# Patient Record
Sex: FEMALE | Race: WHITE | Employment: OTHER | URBAN - METROPOLITAN AREA
[De-identification: names, ages, dates, MRNs, and addresses within clinical notes are randomized per-mention and may not be internally consistent; named-entity substitution may affect disease eponyms.]

---

## 2017-04-27 ENCOUNTER — OFFICE VISIT (OUTPATIENT)
Dept: FAMILY MEDICINE CLINIC | Age: 67
End: 2017-04-27

## 2017-04-27 ENCOUNTER — TELEPHONE (OUTPATIENT)
Dept: FAMILY MEDICINE CLINIC | Age: 67
End: 2017-04-27

## 2017-04-27 VITALS
OXYGEN SATURATION: 97 % | SYSTOLIC BLOOD PRESSURE: 110 MMHG | BODY MASS INDEX: 25.36 KG/M2 | WEIGHT: 157.8 LBS | HEART RATE: 68 BPM | DIASTOLIC BLOOD PRESSURE: 50 MMHG

## 2017-04-27 DIAGNOSIS — D17.1 LIPOMA OF ABDOMINAL WALL: Primary | ICD-10-CM

## 2017-04-27 PROCEDURE — G8399 PT W/DXA RESULTS DOCUMENT: HCPCS | Performed by: FAMILY MEDICINE

## 2017-04-27 PROCEDURE — 3017F COLORECTAL CA SCREEN DOC REV: CPT | Performed by: FAMILY MEDICINE

## 2017-04-27 PROCEDURE — G8427 DOCREV CUR MEDS BY ELIG CLIN: HCPCS | Performed by: FAMILY MEDICINE

## 2017-04-27 PROCEDURE — 3014F SCREEN MAMMO DOC REV: CPT | Performed by: FAMILY MEDICINE

## 2017-04-27 PROCEDURE — 1036F TOBACCO NON-USER: CPT | Performed by: FAMILY MEDICINE

## 2017-04-27 PROCEDURE — G8420 CALC BMI NORM PARAMETERS: HCPCS | Performed by: FAMILY MEDICINE

## 2017-04-27 PROCEDURE — 1123F ACP DISCUSS/DSCN MKR DOCD: CPT | Performed by: FAMILY MEDICINE

## 2017-04-27 PROCEDURE — 1090F PRES/ABSN URINE INCON ASSESS: CPT | Performed by: FAMILY MEDICINE

## 2017-04-27 PROCEDURE — 99213 OFFICE O/P EST LOW 20 MIN: CPT | Performed by: FAMILY MEDICINE

## 2017-04-27 PROCEDURE — 4040F PNEUMOC VAC/ADMIN/RCVD: CPT | Performed by: FAMILY MEDICINE

## 2017-04-27 RX ORDER — DICYCLOMINE HCL 20 MG
20 TABLET ORAL 3 TIMES DAILY
COMMUNITY
End: 2017-07-18 | Stop reason: ALTCHOICE

## 2017-04-27 RX ORDER — DOXEPIN HYDROCHLORIDE 25 MG/1
50 CAPSULE ORAL NIGHTLY
COMMUNITY
End: 2018-07-09 | Stop reason: ALTCHOICE

## 2017-04-27 RX ORDER — CALCIUM/MAGNESIUM/ZINC 333-133 MG
1 TABLET ORAL DAILY
COMMUNITY
End: 2017-06-12 | Stop reason: ALTCHOICE

## 2017-04-27 RX ORDER — METHYLPHENIDATE HYDROCHLORIDE 30 MG/1
20 CAPSULE, EXTENDED RELEASE ORAL 3 TIMES DAILY
COMMUNITY
End: 2017-09-27 | Stop reason: CLARIF

## 2017-04-27 NOTE — TELEPHONE ENCOUNTER
JARED --  Patient's appointment was moved to today because she said bulge in abdomin is feeling strange, warm to touch and a little discolored today.

## 2017-06-05 ENCOUNTER — SURG/PROC ORDERS (OUTPATIENT)
Dept: SURGERY | Age: 67
End: 2017-06-05

## 2017-06-05 ENCOUNTER — INITIAL CONSULT (OUTPATIENT)
Dept: SURGERY | Age: 67
End: 2017-06-05

## 2017-06-05 VITALS
HEIGHT: 66 IN | BODY MASS INDEX: 25.23 KG/M2 | SYSTOLIC BLOOD PRESSURE: 124 MMHG | DIASTOLIC BLOOD PRESSURE: 70 MMHG | WEIGHT: 157 LBS

## 2017-06-05 DIAGNOSIS — D17.1 LIPOMA OF TORSO: Primary | ICD-10-CM

## 2017-06-05 PROCEDURE — G8399 PT W/DXA RESULTS DOCUMENT: HCPCS | Performed by: SURGERY

## 2017-06-05 PROCEDURE — 4040F PNEUMOC VAC/ADMIN/RCVD: CPT | Performed by: SURGERY

## 2017-06-05 PROCEDURE — 1036F TOBACCO NON-USER: CPT | Performed by: SURGERY

## 2017-06-05 PROCEDURE — 3014F SCREEN MAMMO DOC REV: CPT | Performed by: SURGERY

## 2017-06-05 PROCEDURE — 1090F PRES/ABSN URINE INCON ASSESS: CPT | Performed by: SURGERY

## 2017-06-05 PROCEDURE — G8427 DOCREV CUR MEDS BY ELIG CLIN: HCPCS | Performed by: SURGERY

## 2017-06-05 PROCEDURE — 3017F COLORECTAL CA SCREEN DOC REV: CPT | Performed by: SURGERY

## 2017-06-05 PROCEDURE — 1123F ACP DISCUSS/DSCN MKR DOCD: CPT | Performed by: SURGERY

## 2017-06-05 PROCEDURE — 99202 OFFICE O/P NEW SF 15 MIN: CPT | Performed by: SURGERY

## 2017-06-05 PROCEDURE — G8420 CALC BMI NORM PARAMETERS: HCPCS | Performed by: SURGERY

## 2017-06-05 RX ORDER — SODIUM CHLORIDE 0.9 % (FLUSH) 0.9 %
10 SYRINGE (ML) INJECTION PRN
Status: CANCELLED | OUTPATIENT
Start: 2017-06-05

## 2017-06-05 RX ORDER — SODIUM CHLORIDE 0.9 % (FLUSH) 0.9 %
10 SYRINGE (ML) INJECTION EVERY 12 HOURS SCHEDULED
Status: CANCELLED | OUTPATIENT
Start: 2017-06-05

## 2017-06-13 ENCOUNTER — TELEPHONE (OUTPATIENT)
Dept: SURGERY | Age: 67
End: 2017-06-13

## 2017-06-16 ENCOUNTER — HOSPITAL ENCOUNTER (OUTPATIENT)
Dept: SURGERY | Age: 67
Discharge: HOME | End: 2017-06-16
Attending: SURGERY | Admitting: SURGERY

## 2017-06-16 VITALS
OXYGEN SATURATION: 97 % | SYSTOLIC BLOOD PRESSURE: 116 MMHG | RESPIRATION RATE: 20 BRPM | TEMPERATURE: 97.2 F | HEART RATE: 59 BPM | BODY MASS INDEX: 25.23 KG/M2 | WEIGHT: 157 LBS | DIASTOLIC BLOOD PRESSURE: 71 MMHG | HEIGHT: 66 IN

## 2017-06-16 PROCEDURE — 22903 EXC ABD LES SC 3 CM/>: CPT | Performed by: SURGERY

## 2017-06-16 RX ORDER — SODIUM CHLORIDE 0.9 % (FLUSH) 0.9 %
10 SYRINGE (ML) INJECTION EVERY 12 HOURS SCHEDULED
Status: DISCONTINUED | OUTPATIENT
Start: 2017-06-16 | End: 2017-06-17 | Stop reason: HOSPADM

## 2017-06-16 RX ORDER — ONDANSETRON 2 MG/ML
4 INJECTION INTRAMUSCULAR; INTRAVENOUS
Status: ACTIVE | OUTPATIENT
Start: 2017-06-16 | End: 2017-06-16

## 2017-06-16 RX ORDER — OXYCODONE HYDROCHLORIDE AND ACETAMINOPHEN 5; 325 MG/1; MG/1
1 TABLET ORAL
Status: ACTIVE | OUTPATIENT
Start: 2017-06-16 | End: 2017-06-16

## 2017-06-16 RX ORDER — HYDRALAZINE HYDROCHLORIDE 20 MG/ML
5 INJECTION INTRAMUSCULAR; INTRAVENOUS EVERY 10 MIN PRN
Status: DISCONTINUED | OUTPATIENT
Start: 2017-06-16 | End: 2017-06-17 | Stop reason: HOSPADM

## 2017-06-16 RX ORDER — SODIUM CHLORIDE, SODIUM LACTATE, POTASSIUM CHLORIDE, CALCIUM CHLORIDE 600; 310; 30; 20 MG/100ML; MG/100ML; MG/100ML; MG/100ML
INJECTION, SOLUTION INTRAVENOUS CONTINUOUS
Status: DISCONTINUED | OUTPATIENT
Start: 2017-06-16 | End: 2017-06-17 | Stop reason: HOSPADM

## 2017-06-16 RX ORDER — HYDROCODONE BITARTRATE AND ACETAMINOPHEN 5; 325 MG/1; MG/1
1-2 TABLET ORAL EVERY 6 HOURS PRN
Qty: 20 TABLET | Refills: 0 | Status: SHIPPED | OUTPATIENT
Start: 2017-06-16 | End: 2017-07-18 | Stop reason: ALTCHOICE

## 2017-06-16 RX ORDER — SODIUM CHLORIDE, SODIUM LACTATE, POTASSIUM CHLORIDE, CALCIUM CHLORIDE 600; 310; 30; 20 MG/100ML; MG/100ML; MG/100ML; MG/100ML
INJECTION, SOLUTION INTRAVENOUS
Status: DISPENSED
Start: 2017-06-16 | End: 2017-06-16

## 2017-06-16 RX ORDER — MEPERIDINE HYDROCHLORIDE 25 MG/ML
12.5 INJECTION INTRAMUSCULAR; INTRAVENOUS; SUBCUTANEOUS EVERY 5 MIN PRN
Status: DISCONTINUED | OUTPATIENT
Start: 2017-06-16 | End: 2017-06-17 | Stop reason: HOSPADM

## 2017-06-16 RX ORDER — HYDROMORPHONE HCL 110MG/55ML
0.5 PATIENT CONTROLLED ANALGESIA SYRINGE INTRAVENOUS EVERY 5 MIN PRN
Status: DISCONTINUED | OUTPATIENT
Start: 2017-06-16 | End: 2017-06-17 | Stop reason: HOSPADM

## 2017-06-16 RX ORDER — SODIUM CHLORIDE 0.9 % (FLUSH) 0.9 %
10 SYRINGE (ML) INJECTION PRN
Status: DISCONTINUED | OUTPATIENT
Start: 2017-06-16 | End: 2017-06-16 | Stop reason: SDUPTHER

## 2017-06-16 RX ORDER — LIDOCAINE HYDROCHLORIDE 10 MG/ML
0.3 INJECTION, SOLUTION EPIDURAL; INFILTRATION; INTRACAUDAL; PERINEURAL
Status: COMPLETED | OUTPATIENT
Start: 2017-06-16 | End: 2017-06-16

## 2017-06-16 RX ORDER — SODIUM CHLORIDE 0.9 % (FLUSH) 0.9 %
10 SYRINGE (ML) INJECTION EVERY 12 HOURS SCHEDULED
Status: DISCONTINUED | OUTPATIENT
Start: 2017-06-16 | End: 2017-06-16 | Stop reason: SDUPTHER

## 2017-06-16 RX ORDER — LABETALOL HYDROCHLORIDE 5 MG/ML
5 INJECTION, SOLUTION INTRAVENOUS EVERY 10 MIN PRN
Status: DISCONTINUED | OUTPATIENT
Start: 2017-06-16 | End: 2017-06-17 | Stop reason: HOSPADM

## 2017-06-16 RX ORDER — SODIUM CHLORIDE 0.9 % (FLUSH) 0.9 %
10 SYRINGE (ML) INJECTION PRN
Status: DISCONTINUED | OUTPATIENT
Start: 2017-06-16 | End: 2017-06-17 | Stop reason: HOSPADM

## 2017-06-16 RX ADMIN — SODIUM CHLORIDE, SODIUM LACTATE, POTASSIUM CHLORIDE, CALCIUM CHLORIDE: 600; 310; 30; 20 INJECTION, SOLUTION INTRAVENOUS at 09:36

## 2017-06-16 RX ADMIN — LIDOCAINE HYDROCHLORIDE 0.3 ML: 10 INJECTION, SOLUTION EPIDURAL; INFILTRATION; INTRACAUDAL; PERINEURAL at 09:36

## 2017-06-16 ASSESSMENT — PAIN SCALES - GENERAL
PAINLEVEL_OUTOF10: 0
PAINLEVEL_OUTOF10: 0

## 2017-06-16 ASSESSMENT — PAIN - FUNCTIONAL ASSESSMENT: PAIN_FUNCTIONAL_ASSESSMENT: 0-10

## 2017-06-19 ENCOUNTER — TELEPHONE (OUTPATIENT)
Dept: SURGERY | Age: 67
End: 2017-06-19

## 2017-07-10 ENCOUNTER — OFFICE VISIT (OUTPATIENT)
Dept: SURGERY | Age: 67
End: 2017-07-10

## 2017-07-10 VITALS
BODY MASS INDEX: 25.23 KG/M2 | WEIGHT: 157 LBS | SYSTOLIC BLOOD PRESSURE: 124 MMHG | HEIGHT: 66 IN | DIASTOLIC BLOOD PRESSURE: 72 MMHG

## 2017-07-10 DIAGNOSIS — Z98.890 POST-OPERATIVE STATE: Primary | ICD-10-CM

## 2017-07-10 PROCEDURE — 99024 POSTOP FOLLOW-UP VISIT: CPT | Performed by: SURGERY

## 2017-07-18 ENCOUNTER — HOSPITAL ENCOUNTER (OUTPATIENT)
Dept: SURGERY | Age: 67
Discharge: HOME | End: 2017-07-18
Attending: INTERNAL MEDICINE | Admitting: INTERNAL MEDICINE

## 2017-07-18 VITALS
OXYGEN SATURATION: 100 % | DIASTOLIC BLOOD PRESSURE: 78 MMHG | HEIGHT: 66 IN | SYSTOLIC BLOOD PRESSURE: 125 MMHG | BODY MASS INDEX: 25.23 KG/M2 | WEIGHT: 157 LBS | RESPIRATION RATE: 16 BRPM | TEMPERATURE: 98.3 F | HEART RATE: 82 BPM

## 2017-07-18 RX ORDER — SODIUM CHLORIDE, SODIUM LACTATE, POTASSIUM CHLORIDE, CALCIUM CHLORIDE 600; 310; 30; 20 MG/100ML; MG/100ML; MG/100ML; MG/100ML
INJECTION, SOLUTION INTRAVENOUS CONTINUOUS
Status: DISCONTINUED | OUTPATIENT
Start: 2017-07-18 | End: 2017-07-19 | Stop reason: HOSPADM

## 2017-07-18 RX ORDER — LIDOCAINE HYDROCHLORIDE 10 MG/ML
0.1 INJECTION, SOLUTION EPIDURAL; INFILTRATION; INTRACAUDAL; PERINEURAL ONCE
Status: DISCONTINUED | OUTPATIENT
Start: 2017-07-18 | End: 2017-07-19 | Stop reason: HOSPADM

## 2017-07-18 RX ADMIN — SODIUM CHLORIDE, SODIUM LACTATE, POTASSIUM CHLORIDE, CALCIUM CHLORIDE: 600; 310; 30; 20 INJECTION, SOLUTION INTRAVENOUS at 09:46

## 2017-07-18 ASSESSMENT — PAIN - FUNCTIONAL ASSESSMENT: PAIN_FUNCTIONAL_ASSESSMENT: 0-10

## 2017-07-18 ASSESSMENT — PAIN SCALES - GENERAL: PAINLEVEL_OUTOF10: 0

## 2017-07-28 ENCOUNTER — NURSE ONLY (OUTPATIENT)
Dept: FAMILY MEDICINE CLINIC | Age: 67
End: 2017-07-28

## 2017-07-28 ENCOUNTER — TELEPHONE (OUTPATIENT)
Dept: FAMILY MEDICINE CLINIC | Age: 67
End: 2017-07-28

## 2017-07-28 DIAGNOSIS — R35.0 FREQUENT URINATION: Primary | ICD-10-CM

## 2017-07-28 LAB
BILIRUBIN, POC: NORMAL
BLOOD URINE, POC: NORMAL
CLARITY, POC: CLEAR
COLOR, POC: NORMAL
GLUCOSE URINE, POC: NORMAL
KETONES, POC: NORMAL
LEUKOCYTE EST, POC: NORMAL
NITRITE, POC: NORMAL
PH, POC: 5
PROTEIN, POC: NORMAL
SPECIFIC GRAVITY, POC: 1
UROBILINOGEN, POC: 0.2

## 2017-07-28 PROCEDURE — 81002 URINALYSIS NONAUTO W/O SCOPE: CPT | Performed by: FAMILY MEDICINE

## 2017-07-28 NOTE — TELEPHONE ENCOUNTER
Patient says symptoms began two days ago, frequent urination and can not quit once she starts, denies any discomfort or burning with urination but says vaginal area is very tender.   Advised may need an appointment but would check first.

## 2017-07-28 NOTE — TELEPHONE ENCOUNTER
Per Dr. Addison Garcia ok to check her urine but if there is more to it than just the urine she will need to schedule an appointment. She was advised and will stop in to give a sample. Placing on lab schedule.

## 2017-07-31 NOTE — TELEPHONE ENCOUNTER
The patient is calling to inform Dayana Adair that she plans on following up with her OBGYN Ting Lowery in regards to the symptoms she came in for on 7/28/17. She said that her symptoms have since gone away but she had an appointment scheduled awhile ago with her OBGYN so she will just discuss this with him at that time.

## 2017-09-22 ENCOUNTER — TELEPHONE (OUTPATIENT)
Dept: FAMILY MEDICINE CLINIC | Age: 67
End: 2017-09-22

## 2017-09-27 ENCOUNTER — OFFICE VISIT (OUTPATIENT)
Dept: FAMILY MEDICINE CLINIC | Age: 67
End: 2017-09-27

## 2017-09-27 VITALS
BODY MASS INDEX: 27 KG/M2 | WEIGHT: 168 LBS | DIASTOLIC BLOOD PRESSURE: 58 MMHG | HEART RATE: 74 BPM | SYSTOLIC BLOOD PRESSURE: 126 MMHG | OXYGEN SATURATION: 94 % | HEIGHT: 66 IN

## 2017-09-27 DIAGNOSIS — R03.0 ELEVATED BLOOD PRESSURE READING WITHOUT DIAGNOSIS OF HYPERTENSION: ICD-10-CM

## 2017-09-27 DIAGNOSIS — R61 NIGHT SWEATS: Primary | ICD-10-CM

## 2017-09-27 DIAGNOSIS — R14.0 ABDOMINAL BLOATING: ICD-10-CM

## 2017-09-27 PROCEDURE — 3017F COLORECTAL CA SCREEN DOC REV: CPT | Performed by: FAMILY MEDICINE

## 2017-09-27 PROCEDURE — 3014F SCREEN MAMMO DOC REV: CPT | Performed by: FAMILY MEDICINE

## 2017-09-27 PROCEDURE — G8417 CALC BMI ABV UP PARAM F/U: HCPCS | Performed by: FAMILY MEDICINE

## 2017-09-27 PROCEDURE — G8427 DOCREV CUR MEDS BY ELIG CLIN: HCPCS | Performed by: FAMILY MEDICINE

## 2017-09-27 PROCEDURE — 4040F PNEUMOC VAC/ADMIN/RCVD: CPT | Performed by: FAMILY MEDICINE

## 2017-09-27 PROCEDURE — G8399 PT W/DXA RESULTS DOCUMENT: HCPCS | Performed by: FAMILY MEDICINE

## 2017-09-27 PROCEDURE — 1123F ACP DISCUSS/DSCN MKR DOCD: CPT | Performed by: FAMILY MEDICINE

## 2017-09-27 PROCEDURE — 1036F TOBACCO NON-USER: CPT | Performed by: FAMILY MEDICINE

## 2017-09-27 PROCEDURE — 99214 OFFICE O/P EST MOD 30 MIN: CPT | Performed by: FAMILY MEDICINE

## 2017-09-27 PROCEDURE — 1090F PRES/ABSN URINE INCON ASSESS: CPT | Performed by: FAMILY MEDICINE

## 2017-09-27 RX ORDER — METHYLPHENIDATE HYDROCHLORIDE 20 MG/1
20 TABLET ORAL 3 TIMES DAILY
COMMUNITY

## 2017-10-03 ENCOUNTER — OFFICE VISIT (OUTPATIENT)
Dept: FAMILY MEDICINE CLINIC | Age: 67
End: 2017-10-03

## 2017-10-03 VITALS
HEIGHT: 66 IN | HEART RATE: 60 BPM | SYSTOLIC BLOOD PRESSURE: 134 MMHG | TEMPERATURE: 98.1 F | DIASTOLIC BLOOD PRESSURE: 72 MMHG | WEIGHT: 169.6 LBS | OXYGEN SATURATION: 97 % | BODY MASS INDEX: 27.26 KG/M2

## 2017-10-03 DIAGNOSIS — Z01.818 PREOP EXAMINATION: Primary | ICD-10-CM

## 2017-10-03 DIAGNOSIS — M15.9 PRIMARY OSTEOARTHRITIS INVOLVING MULTIPLE JOINTS: ICD-10-CM

## 2017-10-03 DIAGNOSIS — M12.9 ARTHROPATHY: ICD-10-CM

## 2017-10-03 PROCEDURE — 93000 ELECTROCARDIOGRAM COMPLETE: CPT | Performed by: PHYSICIAN ASSISTANT

## 2017-10-03 PROCEDURE — 3014F SCREEN MAMMO DOC REV: CPT | Performed by: PHYSICIAN ASSISTANT

## 2017-10-03 PROCEDURE — 1036F TOBACCO NON-USER: CPT | Performed by: PHYSICIAN ASSISTANT

## 2017-10-03 PROCEDURE — 3017F COLORECTAL CA SCREEN DOC REV: CPT | Performed by: PHYSICIAN ASSISTANT

## 2017-10-03 PROCEDURE — 1123F ACP DISCUSS/DSCN MKR DOCD: CPT | Performed by: PHYSICIAN ASSISTANT

## 2017-10-03 PROCEDURE — 4040F PNEUMOC VAC/ADMIN/RCVD: CPT | Performed by: PHYSICIAN ASSISTANT

## 2017-10-03 PROCEDURE — G8417 CALC BMI ABV UP PARAM F/U: HCPCS | Performed by: PHYSICIAN ASSISTANT

## 2017-10-03 PROCEDURE — 1090F PRES/ABSN URINE INCON ASSESS: CPT | Performed by: PHYSICIAN ASSISTANT

## 2017-10-03 PROCEDURE — G8399 PT W/DXA RESULTS DOCUMENT: HCPCS | Performed by: PHYSICIAN ASSISTANT

## 2017-10-03 PROCEDURE — G8484 FLU IMMUNIZE NO ADMIN: HCPCS | Performed by: PHYSICIAN ASSISTANT

## 2017-10-03 PROCEDURE — 99213 OFFICE O/P EST LOW 20 MIN: CPT | Performed by: PHYSICIAN ASSISTANT

## 2017-10-03 PROCEDURE — G8427 DOCREV CUR MEDS BY ELIG CLIN: HCPCS | Performed by: PHYSICIAN ASSISTANT

## 2017-10-03 NOTE — PROGRESS NOTES
[facility]  [address] [place] [address] Dr Au Model: [phone]   F: [phone]    PRE- OPERATIVE HISTORY AND PHYSICAL    HISTORY OF PRESENT ILLNESS:     History Obtained From:  patient  Shannon Mejia 1950 is a 74 y.o. female who presents to the office today for a pre-operative consultation for      PROCEDURE:  Left right finger arthroplasty               INDICATION FOR PROCEDURE:  Osteoarthritis and pain and dysfunction               DATE OF PROCEDURE: 10/9/2017                PHYSICIAN perfoming PROCEDURE:  Dr. Lyn Cortez at [address] [place] [address]     Planned anesthesia is:  general.      History of adverse or allergic reaction to anesthesia:  No  Teeth: DENTURES? No  Bleeding risk?:    NONE; No recent or remote history of abnormal bleeding. Previous transfusion/Complications:   NONE    REVIEW OF SYSTEMS:    CONSTITUTIONAL:  negative  EYES:  negative  HEENT:  negative  RESPIRATORY:  negative  CARDIOVASCULAR:  negative  GASTROINTESTINAL:  negative  GENITOURINARY:  negative  INTEGUMENT/BREAST:  negative  HEMATOLOGIC/LYMPHATIC:  negative  ALLERGIC/IMMUNOLOGIC:  negative  ENDOCRINE:  negative  MUSCULOSKELETAL:  Bouchards and heberdens nodes bilaterally.   NEUROLOGICAL:  negative       Past Medical History:   Diagnosis Date    Anxiety     Arthritis     Closed fracture of arm approx     Depression     Prolonged emergence from general anesthesia         Past Surgical History:   Procedure Laterality Date    ACHILLES TENDON SURGERY      CARPAL TUNNEL RELEASE      CARPAL TUNNEL RELEASE      left     SECTION   /     COLONOSCOPY      COLONOSCOPY      COLONOSCOPY  2017    diverticulosis, polyp    DILATION AND CURETTAGE OF UTERUS      LAPAROSCOPY  x several    endometriosis    OTHER SURGICAL HISTORY  2017    excision lipoma right abdomen       Social History:   Tobacco use:   History   Smoking Status    Former Smoker   

## 2018-02-12 ENCOUNTER — OFFICE VISIT (OUTPATIENT)
Dept: FAMILY MEDICINE CLINIC | Age: 68
End: 2018-02-12

## 2018-02-12 VITALS
WEIGHT: 179 LBS | DIASTOLIC BLOOD PRESSURE: 60 MMHG | HEIGHT: 66 IN | BODY MASS INDEX: 28.77 KG/M2 | TEMPERATURE: 99.1 F | SYSTOLIC BLOOD PRESSURE: 126 MMHG | OXYGEN SATURATION: 98 % | HEART RATE: 80 BPM

## 2018-02-12 DIAGNOSIS — R63.5 WEIGHT GAIN: ICD-10-CM

## 2018-02-12 DIAGNOSIS — J04.0 LARYNGITIS: Primary | ICD-10-CM

## 2018-02-12 PROCEDURE — 1090F PRES/ABSN URINE INCON ASSESS: CPT | Performed by: PHYSICIAN ASSISTANT

## 2018-02-12 PROCEDURE — G8484 FLU IMMUNIZE NO ADMIN: HCPCS | Performed by: PHYSICIAN ASSISTANT

## 2018-02-12 PROCEDURE — G8399 PT W/DXA RESULTS DOCUMENT: HCPCS | Performed by: PHYSICIAN ASSISTANT

## 2018-02-12 PROCEDURE — G8417 CALC BMI ABV UP PARAM F/U: HCPCS | Performed by: PHYSICIAN ASSISTANT

## 2018-02-12 PROCEDURE — G8427 DOCREV CUR MEDS BY ELIG CLIN: HCPCS | Performed by: PHYSICIAN ASSISTANT

## 2018-02-12 PROCEDURE — 4040F PNEUMOC VAC/ADMIN/RCVD: CPT | Performed by: PHYSICIAN ASSISTANT

## 2018-02-12 PROCEDURE — 3014F SCREEN MAMMO DOC REV: CPT | Performed by: PHYSICIAN ASSISTANT

## 2018-02-12 PROCEDURE — 99213 OFFICE O/P EST LOW 20 MIN: CPT | Performed by: PHYSICIAN ASSISTANT

## 2018-02-12 PROCEDURE — 3017F COLORECTAL CA SCREEN DOC REV: CPT | Performed by: PHYSICIAN ASSISTANT

## 2018-02-12 PROCEDURE — 1123F ACP DISCUSS/DSCN MKR DOCD: CPT | Performed by: PHYSICIAN ASSISTANT

## 2018-02-12 PROCEDURE — 1036F TOBACCO NON-USER: CPT | Performed by: PHYSICIAN ASSISTANT

## 2018-02-12 RX ORDER — CALCIUM/MAGNESIUM/ZINC 333-133 MG
TABLET ORAL
COMMUNITY
End: 2018-07-09

## 2018-02-15 ENCOUNTER — TELEPHONE (OUTPATIENT)
Dept: FAMILY MEDICINE CLINIC | Age: 68
End: 2018-02-15

## 2018-02-16 ENCOUNTER — OFFICE VISIT (OUTPATIENT)
Dept: FAMILY MEDICINE CLINIC | Age: 68
End: 2018-02-16

## 2018-02-16 VITALS
TEMPERATURE: 98.1 F | BODY MASS INDEX: 28.55 KG/M2 | WEIGHT: 178.2 LBS | SYSTOLIC BLOOD PRESSURE: 130 MMHG | DIASTOLIC BLOOD PRESSURE: 70 MMHG | HEART RATE: 76 BPM

## 2018-02-16 DIAGNOSIS — J34.89 PURULENT RHINORRHEA: Primary | ICD-10-CM

## 2018-02-16 PROCEDURE — 1123F ACP DISCUSS/DSCN MKR DOCD: CPT | Performed by: FAMILY MEDICINE

## 2018-02-16 PROCEDURE — 1090F PRES/ABSN URINE INCON ASSESS: CPT | Performed by: FAMILY MEDICINE

## 2018-02-16 PROCEDURE — 99213 OFFICE O/P EST LOW 20 MIN: CPT | Performed by: FAMILY MEDICINE

## 2018-02-16 PROCEDURE — 1036F TOBACCO NON-USER: CPT | Performed by: FAMILY MEDICINE

## 2018-02-16 PROCEDURE — G8484 FLU IMMUNIZE NO ADMIN: HCPCS | Performed by: FAMILY MEDICINE

## 2018-02-16 PROCEDURE — G8417 CALC BMI ABV UP PARAM F/U: HCPCS | Performed by: FAMILY MEDICINE

## 2018-02-16 PROCEDURE — G8399 PT W/DXA RESULTS DOCUMENT: HCPCS | Performed by: FAMILY MEDICINE

## 2018-02-16 PROCEDURE — G8427 DOCREV CUR MEDS BY ELIG CLIN: HCPCS | Performed by: FAMILY MEDICINE

## 2018-02-16 PROCEDURE — 3017F COLORECTAL CA SCREEN DOC REV: CPT | Performed by: FAMILY MEDICINE

## 2018-02-16 PROCEDURE — 3014F SCREEN MAMMO DOC REV: CPT | Performed by: FAMILY MEDICINE

## 2018-02-16 PROCEDURE — 4040F PNEUMOC VAC/ADMIN/RCVD: CPT | Performed by: FAMILY MEDICINE

## 2018-02-16 RX ORDER — AMOXICILLIN AND CLAVULANATE POTASSIUM 875; 125 MG/1; MG/1
1 TABLET, FILM COATED ORAL 2 TIMES DAILY
Qty: 20 TABLET | Refills: 0 | Status: SHIPPED | OUTPATIENT
Start: 2018-02-16 | End: 2018-02-26

## 2018-02-16 NOTE — PROGRESS NOTES
Subjective:      Patient ID: Amada Oneill is a 69 y.o. female. HPI  Amada Oneill comes in with cough productive of yellow-green sputum. She says she began with head congestion approximately 3 weeks ago. Approximately 2 weeks ago she began with a cough and chest congestion. She blows the same yellow-green mucus out of her nose that she coughs up. She has not had any fever. She has developed laryngitis. She is taking Mucinex for her symptoms. She then went on to start talking about abdominal bloating which she has had for 6 months or more. Review of Systems  All other systems were reviewed and are negative      Objective:   Physical Exam   Constitutional: She is oriented to person, place, and time. She appears well-developed and well-nourished. Color good     HENT:   Head: Normocephalic and atraumatic. Right Ear: Tympanic membrane, external ear and ear canal normal.   Left Ear: Tympanic membrane, external ear and ear canal normal.   Nose: Rhinorrhea present. Mouth/Throat: Oropharynx is clear and moist. No oropharyngeal exudate, posterior oropharyngeal edema or posterior oropharyngeal erythema. Positive PND noted   Eyes: Conjunctivae are normal. Pupils are equal, round, and reactive to light. Neck: Normal range of motion. Neck supple. Cardiovascular: Normal rate, regular rhythm and normal heart sounds. Pulmonary/Chest: Effort normal and breath sounds normal. No respiratory distress. She has no wheezes. She has no rales. Lymphadenopathy:     She has no cervical adenopathy. Neurological: She is alert and oriented to person, place, and time. Skin: Skin is warm and dry. Nursing note and vitals reviewed.       Assessment:      Though I see no sign of secondary infection, this congestion has persisted a long time and become purulent      Plan:      Because of the prolonged nature of the illness I'm going to treat her with Augmentin 875/125, #20, one twice a day  I've encouraged her to

## 2018-02-23 ENCOUNTER — OFFICE VISIT (OUTPATIENT)
Dept: FAMILY MEDICINE CLINIC | Age: 68
End: 2018-02-23

## 2018-02-23 VITALS
SYSTOLIC BLOOD PRESSURE: 100 MMHG | WEIGHT: 177.6 LBS | DIASTOLIC BLOOD PRESSURE: 70 MMHG | HEART RATE: 60 BPM | BODY MASS INDEX: 28.45 KG/M2

## 2018-02-23 DIAGNOSIS — R14.0 ABDOMINAL BLOATING: Primary | ICD-10-CM

## 2018-02-23 DIAGNOSIS — R14.2 BELCHING: ICD-10-CM

## 2018-02-23 PROCEDURE — 3017F COLORECTAL CA SCREEN DOC REV: CPT | Performed by: FAMILY MEDICINE

## 2018-02-23 PROCEDURE — G8427 DOCREV CUR MEDS BY ELIG CLIN: HCPCS | Performed by: FAMILY MEDICINE

## 2018-02-23 PROCEDURE — 4040F PNEUMOC VAC/ADMIN/RCVD: CPT | Performed by: FAMILY MEDICINE

## 2018-02-23 PROCEDURE — 1090F PRES/ABSN URINE INCON ASSESS: CPT | Performed by: FAMILY MEDICINE

## 2018-02-23 PROCEDURE — 99213 OFFICE O/P EST LOW 20 MIN: CPT | Performed by: FAMILY MEDICINE

## 2018-02-23 PROCEDURE — G8399 PT W/DXA RESULTS DOCUMENT: HCPCS | Performed by: FAMILY MEDICINE

## 2018-02-23 PROCEDURE — G8484 FLU IMMUNIZE NO ADMIN: HCPCS | Performed by: FAMILY MEDICINE

## 2018-02-23 PROCEDURE — 1036F TOBACCO NON-USER: CPT | Performed by: FAMILY MEDICINE

## 2018-02-23 PROCEDURE — 3014F SCREEN MAMMO DOC REV: CPT | Performed by: FAMILY MEDICINE

## 2018-02-23 PROCEDURE — 1123F ACP DISCUSS/DSCN MKR DOCD: CPT | Performed by: FAMILY MEDICINE

## 2018-02-23 PROCEDURE — G8417 CALC BMI ABV UP PARAM F/U: HCPCS | Performed by: FAMILY MEDICINE

## 2018-02-23 ASSESSMENT — PATIENT HEALTH QUESTIONNAIRE - PHQ9
SUM OF ALL RESPONSES TO PHQ QUESTIONS 1-9: 1
2. FEELING DOWN, DEPRESSED OR HOPELESS: 1
1. LITTLE INTEREST OR PLEASURE IN DOING THINGS: 0
SUM OF ALL RESPONSES TO PHQ9 QUESTIONS 1 & 2: 1

## 2018-02-23 NOTE — PROGRESS NOTES
Subjective:      Patient ID: Valentin Colorado is a 69 y.o. female. HPI  Valentin Colorado returns today to talk about abdominal bloating and belching which has been going on for months. She does not pass gas from below only from above. However when asked about the bloating she designates her entire abdomen from diaphragm to pelvis as being bloated. She has tried abner to see if it would help with the bloating and it has not. She has not tried any medication  She does her routine gynecologic care with Dr. Asher David. These symptoms began after her visit with Dr. Gabby Scott of Systems  All other systems were reviewed and are negative      Objective:   Physical Exam   Constitutional: She is oriented to person, place, and time. She appears well-developed and well-nourished. HENT:   Head: Normocephalic and atraumatic. Eyes: Conjunctivae are normal. Pupils are equal, round, and reactive to light. No scleral icterus. Neck: Normal range of motion. Neck supple. Cardiovascular: Normal rate, regular rhythm and normal heart sounds. Pulmonary/Chest: Effort normal and breath sounds normal.   Abdominal: Soft. Bowel sounds are normal. There is no tenderness. There is no rebound and no guarding. Obese but benign  No masses felt   Neurological: She is alert and oriented to person, place, and time. Skin: Skin is warm and dry. Psychiatric: She has a normal mood and affect. Her behavior is normal. Judgment and thought content normal.   Nursing note and vitals reviewed. Assessment:      Abdominal bloating      Plan:      The presence of the belching certainly makes this bloating ilikely to be more upper abdominal and thus likely digestive in etiology.  However because she designates her entire abdomen as being involved I feel we must look at her pelvis to rule out any ovarian pathology  Pelvic ultrasound to be scheduled  We also discussed dietary modifications to try to decrease the gas production as well as

## 2018-03-05 ENCOUNTER — TELEPHONE (OUTPATIENT)
Dept: FAMILY MEDICINE CLINIC | Age: 68
End: 2018-03-05

## 2018-07-09 ENCOUNTER — OFFICE VISIT (OUTPATIENT)
Dept: FAMILY MEDICINE CLINIC | Age: 68
End: 2018-07-09

## 2018-07-09 VITALS
RESPIRATION RATE: 12 BRPM | HEIGHT: 66 IN | SYSTOLIC BLOOD PRESSURE: 129 MMHG | HEART RATE: 52 BPM | OXYGEN SATURATION: 99 % | TEMPERATURE: 98.9 F | WEIGHT: 168.2 LBS | BODY MASS INDEX: 27.03 KG/M2 | DIASTOLIC BLOOD PRESSURE: 63 MMHG

## 2018-07-09 DIAGNOSIS — R25.2 MUSCLE CRAMPS AT NIGHT: ICD-10-CM

## 2018-07-09 DIAGNOSIS — R42 DIZZINESSES: Primary | ICD-10-CM

## 2018-07-09 LAB
A/G RATIO: 2.1 (ref 1.1–2.2)
ALBUMIN SERPL-MCNC: 4.6 G/DL (ref 3.4–5)
ALP BLD-CCNC: 79 U/L (ref 40–129)
ALT SERPL-CCNC: 19 U/L (ref 10–40)
ANION GAP SERPL CALCULATED.3IONS-SCNC: 15 MMOL/L (ref 3–16)
AST SERPL-CCNC: 21 U/L (ref 15–37)
BILIRUB SERPL-MCNC: 1.3 MG/DL (ref 0–1)
BUN BLDV-MCNC: 12 MG/DL (ref 7–20)
CALCIUM SERPL-MCNC: 10.1 MG/DL (ref 8.3–10.6)
CHLORIDE BLD-SCNC: 102 MMOL/L (ref 99–110)
CO2: 25 MMOL/L (ref 21–32)
CREAT SERPL-MCNC: 0.7 MG/DL (ref 0.6–1.2)
GFR AFRICAN AMERICAN: >60
GFR NON-AFRICAN AMERICAN: >60
GLOBULIN: 2.2 G/DL
GLUCOSE BLD-MCNC: 74 MG/DL (ref 70–99)
MAGNESIUM: 2.4 MG/DL (ref 1.8–2.4)
PHOSPHORUS: 3.6 MG/DL (ref 2.5–4.9)
POTASSIUM SERPL-SCNC: 3.9 MMOL/L (ref 3.5–5.1)
SODIUM BLD-SCNC: 142 MMOL/L (ref 136–145)
TOTAL PROTEIN: 6.8 G/DL (ref 6.4–8.2)

## 2018-07-09 PROCEDURE — 4040F PNEUMOC VAC/ADMIN/RCVD: CPT | Performed by: PHYSICIAN ASSISTANT

## 2018-07-09 PROCEDURE — G8417 CALC BMI ABV UP PARAM F/U: HCPCS | Performed by: PHYSICIAN ASSISTANT

## 2018-07-09 PROCEDURE — 3017F COLORECTAL CA SCREEN DOC REV: CPT | Performed by: PHYSICIAN ASSISTANT

## 2018-07-09 PROCEDURE — 36415 COLL VENOUS BLD VENIPUNCTURE: CPT | Performed by: PHYSICIAN ASSISTANT

## 2018-07-09 PROCEDURE — 1090F PRES/ABSN URINE INCON ASSESS: CPT | Performed by: PHYSICIAN ASSISTANT

## 2018-07-09 PROCEDURE — G8427 DOCREV CUR MEDS BY ELIG CLIN: HCPCS | Performed by: PHYSICIAN ASSISTANT

## 2018-07-09 PROCEDURE — 1036F TOBACCO NON-USER: CPT | Performed by: PHYSICIAN ASSISTANT

## 2018-07-09 PROCEDURE — 99214 OFFICE O/P EST MOD 30 MIN: CPT | Performed by: PHYSICIAN ASSISTANT

## 2018-07-09 PROCEDURE — 1123F ACP DISCUSS/DSCN MKR DOCD: CPT | Performed by: PHYSICIAN ASSISTANT

## 2018-07-09 PROCEDURE — G8399 PT W/DXA RESULTS DOCUMENT: HCPCS | Performed by: PHYSICIAN ASSISTANT

## 2018-07-09 RX ORDER — MECLIZINE HYDROCHLORIDE 25 MG/1
25 TABLET ORAL 3 TIMES DAILY PRN
Qty: 20 TABLET | Refills: 0 | Status: SHIPPED | OUTPATIENT
Start: 2018-07-09 | End: 2019-01-14

## 2018-07-09 RX ORDER — POTASSIUM CHLORIDE 1500 MG/1
20 TABLET, FILM COATED, EXTENDED RELEASE ORAL 2 TIMES DAILY WITH MEALS
COMMUNITY
End: 2018-07-09

## 2018-07-09 RX ORDER — LUTEIN 10 MG
TABLET ORAL
COMMUNITY

## 2018-07-09 NOTE — PROGRESS NOTES
PO Take 7.5 mg by mouth daily       KLONOPIN 0.5 MG tablet TAKE 1 TABLET BY MOUTH THREE TIMES A DAY 90 tablet 0    multivitamin (ANTIOXIDANT;PROSIGHT) TABS per tablet Take 1 tablet by mouth daily.  Coenzyme Q10 100 MG CAPS Take  by mouth 2 times daily. No current facility-administered medications for this visit. PHYSICAL EXAM     Vitals:    07/09/18 1628   BP: 129/63   Pulse: 52   Resp: 12   Temp: 98.9 °F (37.2 °C)   TempSrc: Oral   SpO2: 99%   Weight: 168 lb 3.2 oz (76.3 kg)   Height: 5' 6.25\" (1.683 m)     APPEARANCE: Pleasant, friendly, well-nourished. No acute distress. HEENT: Normocephalic, atraumatic. Eyes: EOMI,PERRLA. Conjunctiva pink and moist.  Sclera white. Funduscopic without hemorrhages or edema. Vision grossly intact. Ears: External ears uniform. Hearing intact. Canals are clear. TMs are intact with fluid bulge. Nose: No septal deviation. Nares are patent. Mouth: Oral mucosa moist. Throat is without erythema, exudates, edema. NECK: No lymphadenopathy or masses. No JVD or bruits. HEART: Reg rate and rhythm. No murmurs, rubs, or gallops. LUNGS: Clear to auscultation. No wheezes, rales, or rhonchi. ABDOMEN:  Soft, bowel sounds present,  non-tender, no masses or organomegaly. MUSCULOSKELETOL:  No new deformity. No clubbing, cyanosis or edema. NEUROLOGIC: Grossly nonfocal.  Cranial nerves II through XII are intact. Romberg negative. No pronator drift. No Nystagmus. SKIN: Warm, dry, normal turgor. Cap refill <3secs. No rashes, petechiae, purpura. ADDITIONAL STUDIES     Pertinent laboratory results and/or imaging reviewed. Orders Placed This Encounter   Procedures    Comprehensive Metabolic Panel    MAGNESIUM    PHOSPHORUS         IMPRESSION/ PLAN      1. Dizzinesses    2. Muscle cramps at night    Discontinue over-the-counter potassium. Lab work today. Trial of meclizine. Follow-up in one week. If worse go to the ER immediately.  Diagnosis and instructions discussed in detail. Patient Instructions   Begin trial of meclizine. Stop the over-the-counter potassium. If worse go to the ER otherwise follow-up in one week.        Orders Placed This Encounter   Medications    meclizine (ANTIVERT) 25 MG tablet     Sig: Take 1 tablet by mouth 3 times daily as needed for Dizziness     Dispense:  20 tablet     Refill:  0

## 2018-07-16 ENCOUNTER — OFFICE VISIT (OUTPATIENT)
Dept: FAMILY MEDICINE CLINIC | Age: 68
End: 2018-07-16

## 2018-07-16 VITALS
BODY MASS INDEX: 27.1 KG/M2 | RESPIRATION RATE: 12 BRPM | OXYGEN SATURATION: 98 % | HEART RATE: 65 BPM | DIASTOLIC BLOOD PRESSURE: 52 MMHG | WEIGHT: 168.6 LBS | SYSTOLIC BLOOD PRESSURE: 118 MMHG | TEMPERATURE: 98 F | HEIGHT: 66 IN

## 2018-07-16 DIAGNOSIS — R42 DIZZINESSES: Primary | ICD-10-CM

## 2018-07-16 DIAGNOSIS — K58.0 IRRITABLE BOWEL SYNDROME WITH DIARRHEA: ICD-10-CM

## 2018-07-16 PROCEDURE — 1036F TOBACCO NON-USER: CPT | Performed by: PHYSICIAN ASSISTANT

## 2018-07-16 PROCEDURE — 1101F PT FALLS ASSESS-DOCD LE1/YR: CPT | Performed by: PHYSICIAN ASSISTANT

## 2018-07-16 PROCEDURE — G8417 CALC BMI ABV UP PARAM F/U: HCPCS | Performed by: PHYSICIAN ASSISTANT

## 2018-07-16 PROCEDURE — G8399 PT W/DXA RESULTS DOCUMENT: HCPCS | Performed by: PHYSICIAN ASSISTANT

## 2018-07-16 PROCEDURE — 3017F COLORECTAL CA SCREEN DOC REV: CPT | Performed by: PHYSICIAN ASSISTANT

## 2018-07-16 PROCEDURE — 4040F PNEUMOC VAC/ADMIN/RCVD: CPT | Performed by: PHYSICIAN ASSISTANT

## 2018-07-16 PROCEDURE — G8427 DOCREV CUR MEDS BY ELIG CLIN: HCPCS | Performed by: PHYSICIAN ASSISTANT

## 2018-07-16 PROCEDURE — 99213 OFFICE O/P EST LOW 20 MIN: CPT | Performed by: PHYSICIAN ASSISTANT

## 2018-07-16 PROCEDURE — 1123F ACP DISCUSS/DSCN MKR DOCD: CPT | Performed by: PHYSICIAN ASSISTANT

## 2018-07-16 PROCEDURE — 1090F PRES/ABSN URINE INCON ASSESS: CPT | Performed by: PHYSICIAN ASSISTANT

## 2018-07-16 NOTE — PROGRESS NOTES
[facility] 25 COMPLAINT  Chief Complaint   Patient presents with    Dizziness     pt states that she was seen last week for dizziness, states that she is feeling better. HISTORY OF PRESENT  ILLNESS  Ana Vee is a 69 y.o.  female   Dizziness Has completely resolved. Meclizine helped for a few days and is no longer taking it. All she wishes to go over herbal medicines. Also complains of bloating in the stomach with soft stools. Has seen the GI specialist, had colonoscopy which shows colitis. Was tried on irritable bowel diarrhea meds of which she did not like. ROS    Remaining are reviewed and otherwise negative for other constitutional, neurologic, EENT, cardiac, pulmonary, GI, , musculoskeletal or extremity complaints. PAST MEDICAL/SURGICAL, SOCIAL, &  FAMILY HISTORY:  Reviewed and updated accordingly. ALLERGIES :   Latex; Strattera [atomoxetine hcl]; Viberzi [eluxadoline]; and Wellbutrin [bupropion hcl]    MEDICATIONS:  Current Outpatient Prescriptions on File Prior to Visit   Medication Sig Dispense Refill    Lutein 10 MG TABS Take by mouth      meclizine (ANTIVERT) 25 MG tablet Take 1 tablet by mouth 3 times daily as needed for Dizziness 20 tablet 0    Cyanocobalamin (VITAMIN B 12 PO) Take by mouth      NONFORMULARY oreganol  supplement      zinc 50 MG CAPS Take by mouth      NONFORMULARY       UNABLE TO FIND Glucosamine, chondroitin, MSM, hyaluronic acid      methylphenidate (RITALIN) 20 MG tablet Take 20 mg by mouth 3 times daily .       Turmeric 500 MG CAPS Take by mouth 2 times daily       CINNAMON PO Take 1,000 mg by mouth 2 times daily       RESVERATROL PO Take 500 mg by mouth 2 times daily       Calcium Carb-Cholecalciferol (CALCIUM 500 +D PO) Take by mouth      Fish Oil OIL Take 1 g by mouth 2 times daily       valACYclovir (VALTREX) 500 MG tablet Take 500 mg by mouth daily       Probiotic Product (PROBIOTIC DAILY PO) Take  by mouth.  MELOXICAM PO Take 7.5 mg by mouth daily       KLONOPIN 0.5 MG tablet TAKE 1 TABLET BY MOUTH THREE TIMES A DAY 90 tablet 0    multivitamin (ANTIOXIDANT;PROSIGHT) TABS per tablet Take 1 tablet by mouth daily.  Coenzyme Q10 100 MG CAPS Take  by mouth 2 times daily.  VORTIoxetine (TRINTELLIX) 10 MG TABS tablet Take 10 mg by mouth daily       No current facility-administered medications on file prior to visit. PHYSICAL EXAM     Vitals:    07/16/18 1411   BP: (!) 118/52   Pulse: 65   Resp: 12   Temp: 98 °F (36.7 °C)   TempSrc: Oral   SpO2: 98%   Weight: 168 lb 9.6 oz (76.5 kg)   Height: 5' 6.25\" (1.683 m)     APPEARANCE: Pleasant, friendly, well-nourished. No acute distress. Head: Normocephalic, atraumatic. Eyes: EOMI,PERRLA. Conjunctiva pink and moist.  Sclera white. Ears: External ears uniform. Hearing intact. TMs intact canals clear. Neck rate is clear. Nose: No septal deviation. Mouth: Oral mucosa moist. Throat is without erythema, exudates, edema. NECK: No lymphadenopathy or masses. HEART: Reg rate and rhythm. No murmurs, rubs, or gallops. LUNGS: Clear to auscultation. No wheezes, rales, or rhonchi. ABDOMEN:  Soft, bowel sounds present,  non-tender, no masses or organomegaly. MUSCULOSKELETOL: BACK, EXTREMITIES:  No new deformity. No clubbing, cyanosis or edema. NEUROLOGIC: Normal gross and sensory exam.  SKIN: Warm, dry, normal turgor. Cap refill <3secs. No rashes, petechiae, purpura. ADDITIONAL STUDIES     Pertinent laboratory results and/or imaging reviewed. No orders of the defined types were placed in this encounter. IMPRESSION/ PLAN      1. Dizzinesses :  Symptoms resolved. We'll continue to monitor. .     2. Irritable bowel syndrome with diarrhea   Does not wish take prescription medications. Discussed syndrome in detail. Diagnosis and instructions discussed in detail.    There are no Patient Instructions on file for

## 2018-08-21 ENCOUNTER — OFFICE VISIT (OUTPATIENT)
Dept: FAMILY MEDICINE CLINIC | Age: 68
End: 2018-08-21

## 2018-08-21 VITALS
OXYGEN SATURATION: 98 % | SYSTOLIC BLOOD PRESSURE: 130 MMHG | HEIGHT: 66 IN | BODY MASS INDEX: 26.68 KG/M2 | WEIGHT: 166 LBS | DIASTOLIC BLOOD PRESSURE: 60 MMHG | HEART RATE: 80 BPM

## 2018-08-21 DIAGNOSIS — M25.561 PAIN, JOINT, KNEE, RIGHT: Primary | ICD-10-CM

## 2018-08-21 DIAGNOSIS — K58.0 IRRITABLE BOWEL SYNDROME WITH DIARRHEA: ICD-10-CM

## 2018-08-21 DIAGNOSIS — M15.9 PRIMARY OSTEOARTHRITIS INVOLVING MULTIPLE JOINTS: Chronic | ICD-10-CM

## 2018-08-21 DIAGNOSIS — M25.562 PAIN IN JOINT OF LEFT KNEE: ICD-10-CM

## 2018-08-21 DIAGNOSIS — F33.40 RECURRENT MAJOR DEPRESSIVE DISORDER, IN REMISSION (HCC): Primary | Chronic | ICD-10-CM

## 2018-08-21 DIAGNOSIS — Z23 NEED FOR PNEUMOCOCCAL VACCINE: ICD-10-CM

## 2018-08-21 PROCEDURE — 1123F ACP DISCUSS/DSCN MKR DOCD: CPT | Performed by: FAMILY MEDICINE

## 2018-08-21 PROCEDURE — 90670 PCV13 VACCINE IM: CPT | Performed by: FAMILY MEDICINE

## 2018-08-21 PROCEDURE — 1101F PT FALLS ASSESS-DOCD LE1/YR: CPT | Performed by: FAMILY MEDICINE

## 2018-08-21 PROCEDURE — G8427 DOCREV CUR MEDS BY ELIG CLIN: HCPCS | Performed by: FAMILY MEDICINE

## 2018-08-21 PROCEDURE — 4040F PNEUMOC VAC/ADMIN/RCVD: CPT | Performed by: FAMILY MEDICINE

## 2018-08-21 PROCEDURE — 99214 OFFICE O/P EST MOD 30 MIN: CPT | Performed by: FAMILY MEDICINE

## 2018-08-21 PROCEDURE — 1090F PRES/ABSN URINE INCON ASSESS: CPT | Performed by: FAMILY MEDICINE

## 2018-08-21 PROCEDURE — G8399 PT W/DXA RESULTS DOCUMENT: HCPCS | Performed by: FAMILY MEDICINE

## 2018-08-21 PROCEDURE — G0009 ADMIN PNEUMOCOCCAL VACCINE: HCPCS | Performed by: FAMILY MEDICINE

## 2018-08-21 PROCEDURE — 1036F TOBACCO NON-USER: CPT | Performed by: FAMILY MEDICINE

## 2018-08-21 PROCEDURE — 3017F COLORECTAL CA SCREEN DOC REV: CPT | Performed by: FAMILY MEDICINE

## 2018-08-21 PROCEDURE — G8417 CALC BMI ABV UP PARAM F/U: HCPCS | Performed by: FAMILY MEDICINE

## 2018-08-21 RX ORDER — CLOBETASOL PROPIONATE 0.5 MG/G
OINTMENT TOPICAL 2 TIMES DAILY
Status: ON HOLD | COMMUNITY
End: 2022-07-29 | Stop reason: HOSPADM

## 2018-08-21 ASSESSMENT — ENCOUNTER SYMPTOMS
DIARRHEA: 1
CONSTIPATION: 0

## 2018-08-21 NOTE — PROGRESS NOTES
25 MG tablet Take 1 tablet by mouth 3 times daily as needed for Dizziness 20 tablet 0    NONFORMULARY oreganol  supplement      zinc 50 MG CAPS Take by mouth      NONFORMULARY        No current facility-administered medications for this visit. Patients past medical history, surgical history, family history, medications and allergies were all reviewed and updated as appropriate today. Review of Systems   Constitutional: Negative for fever and malaise/fatigue. Gastrointestinal: Positive for diarrhea. Negative for constipation. Musculoskeletal: Positive for myalgias. Psychiatric/Behavioral: Positive for depression. The patient is nervous/anxious and has insomnia. Physical Exam   Constitutional: No distress. Neck: Neck supple. Cardiovascular: Normal rate, regular rhythm and normal heart sounds. Pulmonary/Chest: Effort normal and breath sounds normal. She has no wheezes. She has no rales. Musculoskeletal: She exhibits no edema. Neurological: She is alert. Psychiatric: She has a normal mood and affect. Her behavior is normal. Judgment and thought content normal.         Vitals:    08/21/18 1408 08/21/18 1438   BP: (!) 154/74 130/60   Site: Left Arm    Position: Sitting    Cuff Size: Medium Adult    Pulse: 80    SpO2: 98%    Weight: 166 lb (75.3 kg)    Height: 5' 6.25\" (1.683 m)        Assessment/Plan: Caren Thorpe was seen today for dizziness and diarrhea. Diagnoses and all orders for this visit:    Recurrent major depressive disorder, in remission (HCC)Continue with psychiatry    Primary osteoarthritis involving multiple joints continue with rheumatology    Irritable bowel syndrome with diarrhea continue with GI    Need for pneumococcal vaccine  -     Pneumococcal conjugate vaccine 13-valent      Patient will continue to work with her specialist. She is encouraged to exercise by walking on a regular basis.

## 2019-01-14 ENCOUNTER — OFFICE VISIT (OUTPATIENT)
Dept: FAMILY MEDICINE CLINIC | Age: 69
End: 2019-01-14
Payer: MEDICARE

## 2019-01-14 VITALS
HEIGHT: 66 IN | OXYGEN SATURATION: 97 % | HEART RATE: 81 BPM | SYSTOLIC BLOOD PRESSURE: 130 MMHG | RESPIRATION RATE: 18 BRPM | TEMPERATURE: 98.3 F | DIASTOLIC BLOOD PRESSURE: 68 MMHG | WEIGHT: 167 LBS | BODY MASS INDEX: 26.84 KG/M2

## 2019-01-14 DIAGNOSIS — S70.01XA CONTUSION OF RIGHT HIP, INITIAL ENCOUNTER: ICD-10-CM

## 2019-01-14 DIAGNOSIS — J01.90 ACUTE NON-RECURRENT SINUSITIS, UNSPECIFIED LOCATION: ICD-10-CM

## 2019-01-14 DIAGNOSIS — F33.40 RECURRENT MAJOR DEPRESSIVE DISORDER, IN REMISSION (HCC): ICD-10-CM

## 2019-01-14 DIAGNOSIS — J06.9 ACUTE UPPER RESPIRATORY INFECTION: Primary | ICD-10-CM

## 2019-01-14 PROCEDURE — 99214 OFFICE O/P EST MOD 30 MIN: CPT | Performed by: PHYSICIAN ASSISTANT

## 2019-01-14 RX ORDER — AMOXICILLIN 500 MG/1
500 CAPSULE ORAL 3 TIMES DAILY
Qty: 30 CAPSULE | Refills: 0 | Status: SHIPPED | OUTPATIENT
Start: 2019-01-14 | End: 2019-01-24

## 2019-01-14 RX ORDER — GUAIFENESIN 600 MG/1
TABLET, EXTENDED RELEASE ORAL
Qty: 30 TABLET | Refills: 0 | Status: ON HOLD | OUTPATIENT
Start: 2019-01-14 | End: 2022-07-29 | Stop reason: HOSPADM

## 2019-01-16 ENCOUNTER — TELEPHONE (OUTPATIENT)
Dept: FAMILY MEDICINE CLINIC | Age: 69
End: 2019-01-16

## 2019-01-16 NOTE — TELEPHONE ENCOUNTER
Patient was seen on 1-14-19 for sinus infection and prescribed antibiotic. She has taken medication for two days. She states she is not feeling any better. When she blows her nose it is yellow. She is coughing, but not productive, not coughing alot. I advised she needed to complete the antibiotic, let it get into her system and start working. She says the medication makes her a little nauseous.

## 2019-01-16 NOTE — TELEPHONE ENCOUNTER
Patient was taking meds on an empty stomach.  Advised to eat something with her med and give it at least a week to start helping her as she has only taken 2 pills

## 2019-02-08 ENCOUNTER — TELEPHONE (OUTPATIENT)
Dept: FAMILY MEDICINE CLINIC | Age: 69
End: 2019-02-08

## 2019-09-03 ENCOUNTER — OFFICE VISIT (OUTPATIENT)
Dept: FAMILY MEDICINE CLINIC | Age: 69
End: 2019-09-03
Payer: MEDICARE

## 2019-09-03 VITALS
BODY MASS INDEX: 27.1 KG/M2 | OXYGEN SATURATION: 98 % | HEART RATE: 66 BPM | DIASTOLIC BLOOD PRESSURE: 60 MMHG | HEIGHT: 66 IN | SYSTOLIC BLOOD PRESSURE: 116 MMHG | WEIGHT: 168.6 LBS

## 2019-09-03 DIAGNOSIS — K12.1 MOUTH ULCERS: Primary | ICD-10-CM

## 2019-09-03 PROCEDURE — 1036F TOBACCO NON-USER: CPT | Performed by: PHYSICIAN ASSISTANT

## 2019-09-03 PROCEDURE — 4040F PNEUMOC VAC/ADMIN/RCVD: CPT | Performed by: PHYSICIAN ASSISTANT

## 2019-09-03 PROCEDURE — 99213 OFFICE O/P EST LOW 20 MIN: CPT | Performed by: PHYSICIAN ASSISTANT

## 2019-09-03 PROCEDURE — G8427 DOCREV CUR MEDS BY ELIG CLIN: HCPCS | Performed by: PHYSICIAN ASSISTANT

## 2019-09-03 PROCEDURE — G8419 CALC BMI OUT NRM PARAM NOF/U: HCPCS | Performed by: PHYSICIAN ASSISTANT

## 2019-09-03 PROCEDURE — 1123F ACP DISCUSS/DSCN MKR DOCD: CPT | Performed by: PHYSICIAN ASSISTANT

## 2019-09-03 PROCEDURE — G8399 PT W/DXA RESULTS DOCUMENT: HCPCS | Performed by: PHYSICIAN ASSISTANT

## 2019-09-03 PROCEDURE — 3017F COLORECTAL CA SCREEN DOC REV: CPT | Performed by: PHYSICIAN ASSISTANT

## 2019-09-03 PROCEDURE — 1090F PRES/ABSN URINE INCON ASSESS: CPT | Performed by: PHYSICIAN ASSISTANT

## 2019-09-03 PROCEDURE — 3014F SCREEN MAMMO DOC REV: CPT | Performed by: PHYSICIAN ASSISTANT

## 2019-09-03 RX ORDER — QUETIAPINE FUMARATE 25 MG/1
TABLET, FILM COATED ORAL 2 TIMES DAILY
COMMUNITY
Start: 2018-10-16

## 2019-09-03 RX ORDER — CHLORHEXIDINE GLUCONATE 0.12 MG/ML
15 RINSE ORAL 2 TIMES DAILY
Qty: 300 ML | Refills: 0 | Status: SHIPPED | OUTPATIENT
Start: 2019-09-03 | End: 2019-09-13

## 2019-09-03 ASSESSMENT — ENCOUNTER SYMPTOMS: RESPIRATORY NEGATIVE: 1

## 2019-10-02 ENCOUNTER — TELEPHONE (OUTPATIENT)
Dept: FAMILY MEDICINE CLINIC | Age: 69
End: 2019-10-02

## 2020-02-03 ENCOUNTER — TELEPHONE (OUTPATIENT)
Dept: FAMILY MEDICINE CLINIC | Age: 70
End: 2020-02-03

## 2020-02-03 NOTE — TELEPHONE ENCOUNTER
Patient has had sinus pressure and chest congestion 1 week wants to know if she should be seen. Or continue taking over the counter medications.

## 2020-02-04 ENCOUNTER — OFFICE VISIT (OUTPATIENT)
Dept: FAMILY MEDICINE CLINIC | Age: 70
End: 2020-02-04
Payer: MEDICARE

## 2020-02-04 ENCOUNTER — TELEPHONE (OUTPATIENT)
Dept: FAMILY MEDICINE CLINIC | Age: 70
End: 2020-02-04

## 2020-02-04 VITALS
TEMPERATURE: 98.2 F | HEART RATE: 75 BPM | HEIGHT: 66 IN | DIASTOLIC BLOOD PRESSURE: 78 MMHG | SYSTOLIC BLOOD PRESSURE: 118 MMHG | BODY MASS INDEX: 26.03 KG/M2 | WEIGHT: 162 LBS | RESPIRATION RATE: 18 BRPM | OXYGEN SATURATION: 98 %

## 2020-02-04 PROCEDURE — G8417 CALC BMI ABV UP PARAM F/U: HCPCS | Performed by: PHYSICIAN ASSISTANT

## 2020-02-04 PROCEDURE — G8399 PT W/DXA RESULTS DOCUMENT: HCPCS | Performed by: PHYSICIAN ASSISTANT

## 2020-02-04 PROCEDURE — G8427 DOCREV CUR MEDS BY ELIG CLIN: HCPCS | Performed by: PHYSICIAN ASSISTANT

## 2020-02-04 PROCEDURE — 99213 OFFICE O/P EST LOW 20 MIN: CPT | Performed by: PHYSICIAN ASSISTANT

## 2020-02-04 PROCEDURE — 1036F TOBACCO NON-USER: CPT | Performed by: PHYSICIAN ASSISTANT

## 2020-02-04 PROCEDURE — 1090F PRES/ABSN URINE INCON ASSESS: CPT | Performed by: PHYSICIAN ASSISTANT

## 2020-02-04 PROCEDURE — G9899 SCRN MAM PERF RSLTS DOC: HCPCS | Performed by: PHYSICIAN ASSISTANT

## 2020-02-04 PROCEDURE — G8484 FLU IMMUNIZE NO ADMIN: HCPCS | Performed by: PHYSICIAN ASSISTANT

## 2020-02-04 PROCEDURE — 3017F COLORECTAL CA SCREEN DOC REV: CPT | Performed by: PHYSICIAN ASSISTANT

## 2020-02-04 PROCEDURE — 4040F PNEUMOC VAC/ADMIN/RCVD: CPT | Performed by: PHYSICIAN ASSISTANT

## 2020-02-04 PROCEDURE — 1123F ACP DISCUSS/DSCN MKR DOCD: CPT | Performed by: PHYSICIAN ASSISTANT

## 2020-02-04 RX ORDER — BENZONATATE 100 MG/1
100 CAPSULE ORAL 3 TIMES DAILY PRN
Qty: 20 CAPSULE | Refills: 0 | Status: SHIPPED | OUTPATIENT
Start: 2020-02-04 | End: 2020-02-24

## 2020-02-04 RX ORDER — AMOXICILLIN AND CLAVULANATE POTASSIUM 875; 125 MG/1; MG/1
1 TABLET, FILM COATED ORAL 2 TIMES DAILY
Qty: 14 TABLET | Refills: 0 | Status: SHIPPED | OUTPATIENT
Start: 2020-02-04 | End: 2020-02-11

## 2020-02-04 NOTE — PROGRESS NOTES
[address]    CC:    Chief Complaint   Patient presents with    Congestion     Pt has cough, congestion and runny nose x 1 week     Cough    Nasal Congestion         HISTORY OF PRESENT ILLNESS:      Everardo Virgen is a 68 y.o. female here as per chief complaint. Began 14 days ago with purulent nasal discharge, nasal congestion and sneezing. Sick contacts are  none. Remedies tried are mucinex. .Denies fever, myalgias/arthralgias, headache, sneezing, sore throat, rash and neck pain. ROS:  Remaining 14 ROS were reviewed and are unremarkable for other constitutional, EENT, cardiac, pulmonary, GI, , neurologic, musculoskeletal, or integumentary complaints. Past Medical/Surgical/ Social HISTORY: Reviewed and updated. MEDICATIONS/ ALLERGIES:  Reviewed and updated. PHYSICAL EXAMINATION:  Vitals:    02/04/20 1054   BP: 118/78   Site: Right Upper Arm   Position: Sitting   Pulse: 75   Resp: 18   Temp: 98.2 °F (36.8 °C)   TempSrc: Oral   SpO2: 98%   Weight: 162 lb (73.5 kg)   Height: 5' 6.25\" (1.683 m)     GENERAL APPEARANCE:  Looks in no distress. HEAD: Normocephalic. EYES:  EOMI, PERRLA. Conjunctivae pink and moist. Sclera white. EARS:  Negative pinna pull. Canals clear. TM's intact with cloudy   inner ear fluid. No mastoid tenderness. NOSE : mucosa erythematous and swollen  MOUTH/THROAT:     normal, without erythema, without exudate  NECK:  no adenopathy, thyroid normal to palpation  HEART:  Regular rate and rhythm. No murmurs, rubs, or gallops. LUNGS: no wheezes. No rales or rhonchi. Equal chest percussion. ABDOMEN:  Soft, non-tender. No masses. EXTREMITIES:  Moves all extremities. No edema  NEUROLOGIC: Grossly non focal.   SKIN: Warm, dry without rashes, petechia, or purpura. ADDITIONAL DATA:  Prior notes reviewed. No results found for this visit on 02/04/20. ASSESSMENT/ PLAN:    1. Acute bacterial sinusitis    2.  Cough       Take antibiotic on

## 2020-02-04 NOTE — PATIENT INSTRUCTIONS
Take antibiotic on a full stomach. Tessalon Perles for cough as needed. Increase water intake while on Mucinex.

## 2020-02-20 ENCOUNTER — TELEPHONE (OUTPATIENT)
Dept: FAMILY MEDICINE CLINIC | Age: 70
End: 2020-02-20

## 2022-07-25 ENCOUNTER — ANESTHESIA EVENT (OUTPATIENT)
Dept: ENDOSCOPY | Age: 72
DRG: 920 | End: 2022-07-25
Payer: MEDICARE

## 2022-07-25 NOTE — PROGRESS NOTES
ENDOSCOPY PREOP INSTRUCTIONS      You are scheduled for a procedure at [facility] [employer] on 7-26 @ 800. You will need to arrival by: 630 (at least an hour & a half prior to planned start time)  Report to the MAIN entrance on [address] and register at the information desk on the left-hand side of the lobby  You will need your insurance & photo ID with you. For your procedure:     PLEASE FOLLOW ALL INSTRUCTIONS & PREPS GIVEN TO YOU FROM YOUR DOCTOR'S OFFICE. If you have not received these instructions yet, please call the office immediately. Make sure to read them as soon as received. Bring an accurate list of any medications, including the dose/ frequency, with you on the day of the procedure. Make sure to include over the counter medications. If you are taking blood thinners, Aspirin or diabetic medication, make sure to call your doctor as soon as possible for instructions prior to your procedure. Please dress comfortably and do not wear any lotion, powders or jewelry  Arrange for someone to be with you and sign you out & drive you home after your procedure. We allow 2 visitors with you in the hospital & both of you are required to be masked.     WOMEN ONLY OF CHILDBEARING AGE: Please make sure to be able to give a urine sample on arrival      If you have further questions, you may contact your Endoscopist's office or Pre Admission Testing staff at [phone]  [facility] ELGIN López.7/25/2022 .10:28 AM

## 2022-07-26 ENCOUNTER — HOSPITAL ENCOUNTER (OUTPATIENT)
Age: 72
Setting detail: OUTPATIENT SURGERY
Discharge: HOME | DRG: 920 | End: 2022-07-26
Attending: INTERNAL MEDICINE | Admitting: INTERNAL MEDICINE
Payer: MEDICARE

## 2022-07-26 ENCOUNTER — HOSPITAL ENCOUNTER (INPATIENT)
Age: 72
LOS: 2 days | Discharge: HOME | DRG: 920 | End: 2022-07-29
Attending: EMERGENCY MEDICINE | Admitting: INTERNAL MEDICINE
Payer: MEDICARE

## 2022-07-26 ENCOUNTER — APPOINTMENT (OUTPATIENT)
Dept: CT IMAGING | Age: 72
DRG: 920 | End: 2022-07-26
Payer: MEDICARE

## 2022-07-26 ENCOUNTER — ANESTHESIA (OUTPATIENT)
Dept: ENDOSCOPY | Age: 72
DRG: 920 | End: 2022-07-26
Payer: MEDICARE

## 2022-07-26 ENCOUNTER — APPOINTMENT (OUTPATIENT)
Dept: GENERAL RADIOLOGY | Age: 72
DRG: 920 | End: 2022-07-26
Attending: INTERNAL MEDICINE
Payer: MEDICARE

## 2022-07-26 VITALS
BODY MASS INDEX: 23.3 KG/M2 | WEIGHT: 145 LBS | SYSTOLIC BLOOD PRESSURE: 134 MMHG | HEART RATE: 58 BPM | HEIGHT: 66 IN | TEMPERATURE: 97.8 F | DIASTOLIC BLOOD PRESSURE: 48 MMHG | RESPIRATION RATE: 16 BRPM | OXYGEN SATURATION: 100 %

## 2022-07-26 DIAGNOSIS — D73.5 PERISPLENIC HEMATOMA: Primary | ICD-10-CM

## 2022-07-26 DIAGNOSIS — R19.7 DIARRHEA, UNSPECIFIED TYPE: ICD-10-CM

## 2022-07-26 LAB
A/G RATIO: 1.9 (ref 1.1–2.2)
ALBUMIN SERPL-MCNC: 4.3 G/DL (ref 3.4–5)
ALP BLD-CCNC: 73 U/L (ref 40–129)
ALT SERPL-CCNC: 18 U/L (ref 10–40)
ANION GAP SERPL CALCULATED.3IONS-SCNC: 11 MMOL/L (ref 3–16)
AST SERPL-CCNC: 16 U/L (ref 15–37)
BASOPHILS ABSOLUTE: 0.1 K/UL (ref 0–0.2)
BASOPHILS RELATIVE PERCENT: 0.6 %
BILIRUB SERPL-MCNC: 0.7 MG/DL (ref 0–1)
BUN BLDV-MCNC: 15 MG/DL (ref 7–20)
CALCIUM SERPL-MCNC: 9.4 MG/DL (ref 8.3–10.6)
CHLORIDE BLD-SCNC: 101 MMOL/L (ref 99–110)
CO2: 25 MMOL/L (ref 21–32)
CREAT SERPL-MCNC: <0.5 MG/DL (ref 0.6–1.2)
EOSINOPHILS ABSOLUTE: 0.1 K/UL (ref 0–0.6)
EOSINOPHILS RELATIVE PERCENT: 1.3 %
GFR AFRICAN AMERICAN: >60
GFR NON-AFRICAN AMERICAN: >60
GLUCOSE BLD-MCNC: 90 MG/DL (ref 70–99)
HCT VFR BLD CALC: 37 % (ref 36–48)
HEMOGLOBIN: 12.3 G/DL (ref 12–16)
LACTIC ACID: 0.4 MMOL/L (ref 0.4–2)
LIPASE: 29 U/L (ref 13–60)
LYMPHOCYTES ABSOLUTE: 2 K/UL (ref 1–5.1)
LYMPHOCYTES RELATIVE PERCENT: 23.1 %
MCH RBC QN AUTO: 30.6 PG (ref 26–34)
MCHC RBC AUTO-ENTMCNC: 33.3 G/DL (ref 31–36)
MCV RBC AUTO: 92 FL (ref 80–100)
MONOCYTES ABSOLUTE: 0.7 K/UL (ref 0–1.3)
MONOCYTES RELATIVE PERCENT: 8.4 %
NEUTROPHILS ABSOLUTE: 5.7 K/UL (ref 1.7–7.7)
NEUTROPHILS RELATIVE PERCENT: 66.6 %
PDW BLD-RTO: 14.4 % (ref 12.4–15.4)
PLATELET # BLD: 308 K/UL (ref 135–450)
PMV BLD AUTO: 8 FL (ref 5–10.5)
POTASSIUM SERPL-SCNC: 3.6 MMOL/L (ref 3.5–5.1)
RBC # BLD: 4.02 M/UL (ref 4–5.2)
SODIUM BLD-SCNC: 137 MMOL/L (ref 136–145)
TOTAL PROTEIN: 6.6 G/DL (ref 6.4–8.2)
WBC # BLD: 8.6 K/UL (ref 4–11)

## 2022-07-26 PROCEDURE — 85025 COMPLETE CBC W/AUTO DIFF WBC: CPT

## 2022-07-26 PROCEDURE — 6360000002 HC RX W HCPCS: Performed by: NURSE ANESTHETIST, CERTIFIED REGISTERED

## 2022-07-26 PROCEDURE — 3609010600 HC COLONOSCOPY POLYPECTOMY SNARE/COLD BIOPSY: Performed by: INTERNAL MEDICINE

## 2022-07-26 PROCEDURE — 6360000004 HC RX CONTRAST MEDICATION: Performed by: EMERGENCY MEDICINE

## 2022-07-26 PROCEDURE — 7100000010 HC PHASE II RECOVERY - FIRST 15 MIN: Performed by: INTERNAL MEDICINE

## 2022-07-26 PROCEDURE — 99285 EMERGENCY DEPT VISIT HI MDM: CPT

## 2022-07-26 PROCEDURE — 36415 COLL VENOUS BLD VENIPUNCTURE: CPT

## 2022-07-26 PROCEDURE — 2709999900 HC NON-CHARGEABLE SUPPLY: Performed by: INTERNAL MEDICINE

## 2022-07-26 PROCEDURE — 74177 CT ABD & PELVIS W/CONTRAST: CPT

## 2022-07-26 PROCEDURE — 83605 ASSAY OF LACTIC ACID: CPT

## 2022-07-26 PROCEDURE — 74022 RADEX COMPL AQT ABD SERIES: CPT

## 2022-07-26 PROCEDURE — 7100000011 HC PHASE II RECOVERY - ADDTL 15 MIN: Performed by: INTERNAL MEDICINE

## 2022-07-26 PROCEDURE — 0DBN8ZX EXCISION OF SIGMOID COLON, VIA NATURAL OR ARTIFICIAL OPENING ENDOSCOPIC, DIAGNOSTIC: ICD-10-PCS | Performed by: INTERNAL MEDICINE

## 2022-07-26 PROCEDURE — 2500000003 HC RX 250 WO HCPCS: Performed by: NURSE ANESTHETIST, CERTIFIED REGISTERED

## 2022-07-26 PROCEDURE — 2580000003 HC RX 258: Performed by: ANESTHESIOLOGY

## 2022-07-26 PROCEDURE — 3700000000 HC ANESTHESIA ATTENDED CARE: Performed by: INTERNAL MEDICINE

## 2022-07-26 PROCEDURE — 88305 TISSUE EXAM BY PATHOLOGIST: CPT

## 2022-07-26 PROCEDURE — 83690 ASSAY OF LIPASE: CPT

## 2022-07-26 PROCEDURE — 3609010300 HC COLONOSCOPY W/BIOPSY SINGLE/MULTIPLE: Performed by: INTERNAL MEDICINE

## 2022-07-26 PROCEDURE — 80053 COMPREHEN METABOLIC PANEL: CPT

## 2022-07-26 PROCEDURE — 3700000001 HC ADD 15 MINUTES (ANESTHESIA): Performed by: INTERNAL MEDICINE

## 2022-07-26 RX ORDER — SODIUM CHLORIDE, SODIUM LACTATE, POTASSIUM CHLORIDE, CALCIUM CHLORIDE 600; 310; 30; 20 MG/100ML; MG/100ML; MG/100ML; MG/100ML
INJECTION, SOLUTION INTRAVENOUS CONTINUOUS
Status: DISCONTINUED | OUTPATIENT
Start: 2022-07-26 | End: 2022-07-26 | Stop reason: HOSPADM

## 2022-07-26 RX ORDER — MEPERIDINE HYDROCHLORIDE 25 MG/ML
12.5 INJECTION INTRAMUSCULAR; INTRAVENOUS; SUBCUTANEOUS EVERY 5 MIN PRN
Status: CANCELLED | OUTPATIENT
Start: 2022-07-26

## 2022-07-26 RX ORDER — SODIUM CHLORIDE 0.9 % (FLUSH) 0.9 %
5-40 SYRINGE (ML) INJECTION EVERY 12 HOURS SCHEDULED
Status: DISCONTINUED | OUTPATIENT
Start: 2022-07-26 | End: 2022-07-26 | Stop reason: HOSPADM

## 2022-07-26 RX ORDER — MORPHINE SULFATE 4 MG/ML
4 INJECTION, SOLUTION INTRAMUSCULAR; INTRAVENOUS ONCE
Status: COMPLETED | OUTPATIENT
Start: 2022-07-27 | End: 2022-07-27

## 2022-07-26 RX ORDER — OXYCODONE HYDROCHLORIDE 5 MG/1
5 TABLET ORAL PRN
Status: CANCELLED | OUTPATIENT
Start: 2022-07-26 | End: 2022-07-26

## 2022-07-26 RX ORDER — DIPHENHYDRAMINE HYDROCHLORIDE 50 MG/ML
12.5 INJECTION INTRAMUSCULAR; INTRAVENOUS
Status: CANCELLED | OUTPATIENT
Start: 2022-07-26 | End: 2022-07-26

## 2022-07-26 RX ORDER — SODIUM CHLORIDE 0.9 % (FLUSH) 0.9 %
5-40 SYRINGE (ML) INJECTION PRN
Status: CANCELLED | OUTPATIENT
Start: 2022-07-26

## 2022-07-26 RX ORDER — OXYCODONE HYDROCHLORIDE 5 MG/1
10 TABLET ORAL PRN
Status: CANCELLED | OUTPATIENT
Start: 2022-07-26 | End: 2022-07-26

## 2022-07-26 RX ORDER — GLYCOPYRROLATE 0.2 MG/ML
INJECTION INTRAMUSCULAR; INTRAVENOUS PRN
Status: DISCONTINUED | OUTPATIENT
Start: 2022-07-26 | End: 2022-07-26 | Stop reason: SDUPTHER

## 2022-07-26 RX ORDER — LABETALOL HYDROCHLORIDE 5 MG/ML
10 INJECTION, SOLUTION INTRAVENOUS
Status: CANCELLED | OUTPATIENT
Start: 2022-07-26

## 2022-07-26 RX ORDER — SODIUM CHLORIDE 9 MG/ML
25 INJECTION, SOLUTION INTRAVENOUS PRN
Status: CANCELLED | OUTPATIENT
Start: 2022-07-26

## 2022-07-26 RX ORDER — PROPOFOL 10 MG/ML
INJECTION, EMULSION INTRAVENOUS PRN
Status: DISCONTINUED | OUTPATIENT
Start: 2022-07-26 | End: 2022-07-26 | Stop reason: SDUPTHER

## 2022-07-26 RX ORDER — SODIUM CHLORIDE 9 MG/ML
INJECTION, SOLUTION INTRAVENOUS PRN
Status: DISCONTINUED | OUTPATIENT
Start: 2022-07-26 | End: 2022-07-26 | Stop reason: HOSPADM

## 2022-07-26 RX ORDER — SODIUM CHLORIDE 0.9 % (FLUSH) 0.9 %
5-40 SYRINGE (ML) INJECTION EVERY 12 HOURS SCHEDULED
Status: CANCELLED | OUTPATIENT
Start: 2022-07-26

## 2022-07-26 RX ORDER — HYDRALAZINE HYDROCHLORIDE 20 MG/ML
10 INJECTION INTRAMUSCULAR; INTRAVENOUS
Status: CANCELLED | OUTPATIENT
Start: 2022-07-26

## 2022-07-26 RX ORDER — SODIUM CHLORIDE 0.9 % (FLUSH) 0.9 %
5-40 SYRINGE (ML) INJECTION PRN
Status: DISCONTINUED | OUTPATIENT
Start: 2022-07-26 | End: 2022-07-26 | Stop reason: HOSPADM

## 2022-07-26 RX ORDER — METOCLOPRAMIDE HYDROCHLORIDE 5 MG/ML
10 INJECTION INTRAMUSCULAR; INTRAVENOUS
Status: CANCELLED | OUTPATIENT
Start: 2022-07-26 | End: 2022-07-26

## 2022-07-26 RX ORDER — PROPOFOL 10 MG/ML
INJECTION, EMULSION INTRAVENOUS CONTINUOUS PRN
Status: DISCONTINUED | OUTPATIENT
Start: 2022-07-26 | End: 2022-07-26 | Stop reason: SDUPTHER

## 2022-07-26 RX ORDER — LIDOCAINE HYDROCHLORIDE 20 MG/ML
INJECTION, SOLUTION INTRAVENOUS PRN
Status: DISCONTINUED | OUTPATIENT
Start: 2022-07-26 | End: 2022-07-26 | Stop reason: SDUPTHER

## 2022-07-26 RX ADMIN — LIDOCAINE HYDROCHLORIDE 50 MG: 20 INJECTION, SOLUTION INTRAVENOUS at 08:15

## 2022-07-26 RX ADMIN — SODIUM CHLORIDE, SODIUM LACTATE, POTASSIUM CHLORIDE, AND CALCIUM CHLORIDE: .6; .31; .03; .02 INJECTION, SOLUTION INTRAVENOUS at 08:02

## 2022-07-26 RX ADMIN — PROPOFOL 75 MG: 10 INJECTION, EMULSION INTRAVENOUS at 08:15

## 2022-07-26 RX ADMIN — PROPOFOL 125 MCG/KG/MIN: 10 INJECTION, EMULSION INTRAVENOUS at 08:16

## 2022-07-26 RX ADMIN — PROPOFOL 20 MG: 10 INJECTION, EMULSION INTRAVENOUS at 08:20

## 2022-07-26 RX ADMIN — IOPAMIDOL 80 ML: 755 INJECTION, SOLUTION INTRAVENOUS at 23:32

## 2022-07-26 RX ADMIN — PROPOFOL 20 MG: 10 INJECTION, EMULSION INTRAVENOUS at 08:18

## 2022-07-26 RX ADMIN — GLYCOPYRROLATE 0.2 MG: 0.2 INJECTION INTRAMUSCULAR; INTRAVENOUS at 08:37

## 2022-07-26 ASSESSMENT — PAIN DESCRIPTION - DESCRIPTORS
DESCRIPTORS: CRAMPING;SHARP
DESCRIPTORS: DISCOMFORT;CRAMPING;SHARP;STABBING
DESCRIPTORS: ACHING
DESCRIPTORS: SHARP

## 2022-07-26 ASSESSMENT — PAIN - FUNCTIONAL ASSESSMENT
PAIN_FUNCTIONAL_ASSESSMENT: 0-10
PAIN_FUNCTIONAL_ASSESSMENT: 0-10

## 2022-07-26 ASSESSMENT — PAIN DESCRIPTION - PAIN TYPE
TYPE: ACUTE PAIN
TYPE: ACUTE PAIN

## 2022-07-26 ASSESSMENT — PAIN DESCRIPTION - ORIENTATION
ORIENTATION: LEFT;LOWER;MID;UPPER
ORIENTATION: LEFT;LOWER
ORIENTATION: LEFT

## 2022-07-26 ASSESSMENT — PAIN DESCRIPTION - FREQUENCY
FREQUENCY: INTERMITTENT
FREQUENCY: CONTINUOUS
FREQUENCY: CONTINUOUS
FREQUENCY: INTERMITTENT

## 2022-07-26 ASSESSMENT — PAIN SCALES - GENERAL
PAINLEVEL_OUTOF10: 7
PAINLEVEL_OUTOF10: 10
PAINLEVEL_OUTOF10: 9
PAINLEVEL_OUTOF10: 10

## 2022-07-26 ASSESSMENT — PAIN DESCRIPTION - LOCATION
LOCATION: ABDOMEN

## 2022-07-26 NOTE — DISCHARGE INSTRUCTIONS
ENDOSCOPY DISCHARGE INSTRUCTIONS:    Call the physician that did your procedure for any questions or concern:    [facility]: [phone]  DR. ROSANGELA ELIZALDE      ACTIVITY:    There are potential side effects to the medications used for sedation and anesthesia during your procedure. These include:  Dizziness or light-headedness, confusion or memory loss, delayed reaction times, loss of coordination, nausea and vomiting. Because of your increased risk for injury, we ask that you observe the following precautions: For the next 24 hours,  DO NOT operate an automobile, bicycle, motorcycle, , power tools or large equipment of any kind. Do not drink alcohol, sign any legal documents or make any legal decisions for 24 hours. Do not bend your head over lower than your heart. DO sit on the side of bed/couch awhile before getting up. Plan on bedrest or quiet relaxation today. You may resume normal activities in 24 hours. DIET:    Your first meal today should be light, avoiding spicy and fatty foods. If you tolerate this first meal, then you may advance to your regular diet unless otherwise advised by your physician. NORMAL SYMPTOMS:  -Mild sore throat if youve had an EGD   -Gaseous discomfort    NOTIFY YOUR PHYSICIAN IF THESE SYMPTOMS OCCUR:  1. Fever (greater than 100)  5. Increased abdominal bloating  2. Severe pain    6. Excessive bleeding  3. Nausea and vomiting  7. Chest pain                                                                    4. Chills    8. Shortness of breath    ADDITIONAL INSTRUCTIONS:    Biopsy results: Call [address],[facility] for biopsy results in 1 week    Educational Information:           Learning About Colitis  What is colitis? Colitis is swelling (inflammation) of the colon. The colon makes up most of thelarge intestine. Many conditions can cause colitis. What are some types of colitis? Infectious colitis is a type that appears suddenly.  It's caused by a bacteria or virus, such as salmonella, shigella, or campylobacter. Ischemic colitis is caused by problems with blood flow to the colon. This can happen after surgery. It can also be caused by other health problems. Microscopic colitis doesn't always have a clear cause. It can only be found with special tests. Crohn's disease and ulcerative colitis are lifelong diseases. They can cause swelling, inflammation, and deep sores in the lining of the digestive tract. What are the symptoms? Symptoms may include fever, diarrhea that may be bloody, or belly pain. You may also have an urgent need to move your bowels or pain when you move yourbowels. Or you may have bleeding from the rectum or weight loss. Your symptoms may depend on the type of colitis you have. For example,microscopic colitis may cause watery diarrhea. Sometimes symptoms go away on their own. If they don't go away, or if you havebleeding or severe pain, call your doctor right away. How is it diagnosed? You may need blood tests or a stool test. You also may need imaging tests like a CT scan. You may have a colonoscopy so that a doctor can look inside your colon. In some cases, the doctor may want to test a sample of tissue from theintestine. This test is called a biopsy. How is it treated? Treatment for colitis depends on the condition that is causing it. Antibiotics may be used to treat an infection. Diet changes may help with symptoms. Medicines can also help to relieve inflammation and control symptoms. In some cases, surgery to remove parts of the intestine may be needed. Follow-up care is a key part of your treatment and safety. Be sure to make and go to all appointments, and call your doctor if you are having problems. It's also a good idea to know your test results and keep alist of the medicines you take. Where can you learn more? Go to https://chpepicewjones.[email or web address]. org and sign in to your Bar[employer] account.  Enter C130 in the [address] Information box to learn more about \"Learning About Colitis. \"     If you do not have an account, please click on the \"Sign Up Now\" link. Current as of: September 8, 2021               Content Version: 13.3  © 2006-2022 [employer]. Care instructions adapted under license by [facility] ([facility]). If you have questions about a medical condition or this instruction, always ask your healthcare professional. [address] any warranty or liability for your use of this information. Learning About Colitis  What is colitis? Colitis is swelling (inflammation) of the colon. The colon makes up most of thelarge intestine. Many conditions can cause colitis. What are some types of colitis? Infectious colitis is a type that appears suddenly. It's caused by a bacteria or virus, such as salmonella, shigella, or campylobacter. Ischemic colitis is caused by problems with blood flow to the colon. This can happen after surgery. It can also be caused by other health problems. Microscopic colitis doesn't always have a clear cause. It can only be found with special tests. Crohn's disease and ulcerative colitis are lifelong diseases. They can cause swelling, inflammation, and deep sores in the lining of the digestive tract. What are the symptoms? Symptoms may include fever, diarrhea that may be bloody, or belly pain. You may also have an urgent need to move your bowels or pain when you move yourbowels. Or you may have bleeding from the rectum or weight loss. Your symptoms may depend on the type of colitis you have. For example,microscopic colitis may cause watery diarrhea. Sometimes symptoms go away on their own. If they don't go away, or if you havebleeding or severe pain, call your doctor right away. How is it diagnosed? You may need blood tests or a stool test. You also may need imaging tests like a CT scan. You may have a colonoscopy so that a doctor can look inside your colon.  In some cases, the doctor may want to test a sample of tissue from theintestine. This test is called a biopsy. How is it treated? Treatment for colitis depends on the condition that is causing it. Antibiotics may be used to treat an infection. Diet changes may help with symptoms. Medicines can also help to relieve inflammation and control symptoms. In some cases, surgery to remove parts of the intestine may be needed. Follow-up care is a key part of your treatment and safety. Be sure to make and go to all appointments, and call your doctor if you are having problems. It's also a good idea to know your test results and keep alist of the medicines you take. Where can you learn more? Go to https://ITC.Ourcast. org and sign in to your anfix account. Enter C130 in the Inspire box to learn more about \"Learning About Colitis. \"     If you do not have an account, please click on the \"Sign Up Now\" link. Current as of: September 8, 2021               Content Version: 13.3  © 1467-9679 [employer], [employer]. Care instructions adapted under license by [facility] ([facility]). If you have questions about a medical condition or this instruction, always ask your healthcare professional. [address] any warranty or liability for your use of this information. Please review these discharge instructions this evening or tomorrow for  information you may have forgotten. We want to thank you for choosing the [facility] your [facility] care provider. We always strive to provide you with excellent care while you are here. You may receive a survey in the mail regarding your care. We would appreciate you taking a few minutes of your time to complete this survey.

## 2022-07-26 NOTE — ANESTHESIA POSTPROCEDURE EVALUATION
Department of Anesthesiology  Postprocedure Note    Patient: Kya Uriarte  MRN: [id number]  YOB: 1950  Date of evaluation: 7/26/2022      Procedure Summary     Date: 07/26/22 Room / Location: [facility]    Anesthesia Start: 1463 Anesthesia Stop: 7489    Procedures:       COLONOSCOPY WITH BIOPSY      COLONOSCOPY POLYPECTOMY SNARE/COLD BIOPSY Diagnosis:       Diarrhea, unspecified type      (Diarrhea, unspecified type [R19.7])    Surgeons: Basil Delgado MD Responsible Provider: Reddy Escamilla MD    Anesthesia Type: general ASA Status: 2          Anesthesia Type: No value filed.     Luca Phase I: Luca Score: 10    Luca Phase II:        Anesthesia Post Evaluation    Patient location during evaluation: PACU  Patient participation: complete - patient participated  Level of consciousness: awake and alert  Pain score: 0  Airway patency: patent  Nausea & Vomiting: no nausea and no vomiting  Complications: no  Cardiovascular status: hemodynamically stable  Respiratory status: acceptable  Hydration status: euvolemic

## 2022-07-26 NOTE — ANESTHESIA PRE PROCEDURE
Department of Anesthesiology  Preprocedure Note       Name:  Jh Valdovinos   Age:  66 y.o.  :  1950                                          MRN:  [id number]         Date:  2022      Surgeon: Edna Mulligan):  Justin Wells MD    Procedure: Procedure(s):  COLONOSCOPY    Medications prior to admission:   Prior to Admission medications    Medication Sig Start Date End Date Taking? Authorizing Provider   QUEtiapine (SEROQUEL) 25 MG tablet 2 times daily  10/16/18   Historical Provider, MD   VORTIoxetine (TRINTELLIX) 10 MG TABS tablet Take by mouth    Historical Provider, MD   guaiFENesin (MUCINEX) 600 MG extended release tablet 1-2 tablets of 600mg twice a day as needed. Increase water intake. 19   DEION Oliveira   Glucosamine-Chondroitin (MOVE FREE PO) Take by mouth    Historical Provider, MD   clobetasol (TEMOVATE) 0.05 % ointment Apply topically 2 times daily Apply topically 2 times daily. Historical Provider, MD   Lutein 10 MG TABS Take by mouth    Historical Provider, MD   Cyanocobalamin (VITAMIN B 12 PO) Take by mouth    Historical Provider, MD   NONFORMULARY oreganol  supplement    Historical Provider, MD   zinc 50 MG CAPS Take by mouth    Historical Provider, MD   NONFORMULARY     Historical Provider, MD   methylphenidate (RITALIN) 20 MG tablet Take 20 mg by mouth 3 times daily .     Historical Provider, MD   Turmeric 500 MG CAPS Take by mouth 2 times daily     Historical Provider, MD   CINNAMON PO Take 1,000 mg by mouth 2 times daily     Historical Provider, MD   RESVERATROL PO Take 500 mg by mouth 2 times daily     Historical Provider, MD   Fish Oil OIL Take 1 g by mouth 2 times daily     Historical Provider, MD   valACYclovir (VALTREX) 500 MG tablet Take 500 mg by mouth daily  11/6/15   Historical Provider, MD   MELOXICAM PO Take 7.5 mg by mouth daily     Historical Provider, MD   KLONOPIN 0.5 MG tablet TAKE 1 TABLET BY MOUTH THREE TIMES A DAY 12   Maite Bautista MD multivitamin (ANTIOXIDANT;PROSIGHT) TABS per tablet Take 1 tablet by mouth daily. Historical Provider, MD   Coenzyme Q10 100 MG CAPS Take  by mouth 2 times daily. Historical Provider, MD       Current medications:    No current facility-administered medications for this encounter. Allergies: Allergies   Allergen Reactions    Latex     Strattera [Atomoxetine Hcl]      Hair loss      Viberzi [Eluxadoline] Nausea Only    Wellbutrin [Bupropion Hcl] Swelling       Problem List:    Patient Active Problem List   Diagnosis Code    Family history of breast cancer Z80.3    Osteoarthritis M19.90    Elevated LDL cholesterol level E78.00    ADD (attention deficit disorder) F98.8    Lipoma of torso D17.1    Irritable bowel syndrome with diarrhea K58.0    Recurrent major depressive disorder, in remission ([facility] [address] F33.40       Past Medical History:        Diagnosis Date    Anxiety     Arthritis     Closed fracture of arm approx     Depression     Prolonged emergence from general anesthesia        Past Surgical History:        Procedure Laterality Date    ACHILLES TENDON SURGERY      CARPAL TUNNEL RELEASE      CARPAL TUNNEL RELEASE      left     SECTION      COLONOSCOPY      COLONOSCOPY      COLONOSCOPY  2017    diverticulosis, polyp    DILATION AND CURETTAGE OF UTERUS      FINGER SURGERY  10/05/2017    Silastic proximal interphalangeal joint arthroplasty, distal interphalangeal joint fusion left ring     LAPAROSCOPY  x several    endometriosis    OTHER SURGICAL HISTORY  2017    excision lipoma right abdomen       Social History:    Social History     Tobacco Use    Smoking status: Former     Packs/day: 0.50     Years: 20.00     Pack years: 10.00     Types: Cigarettes     Quit date: 2016     Years since quittin.1    Smokeless tobacco: Never    Tobacco comments:      quit  with hypnotism. quit for over a year .  quit cigs again in May, 2016, but still occ cigarin 2007, but started back when lost job. 4/15/16 down to 1/4-1/2 ppd. stopped again in May, 2016 but still smokes occ cigar . no cigars since about July, 20   Substance Use Topics    Alcohol use: Yes     Alcohol/week: 0.0 standard drinks     Comment: 3 times a year                                Counseling given: Not Answered  Tobacco comments:  quit 1980 with hypnotism. quit for over a year . quit cigs again in May, 2016, but still occ cigarin 2007, but started back when lost job. 4/15/16 down to 1/4-1/2 ppd. stopped again in May, 2016 but still smokes occ cigar . no cigars since about July, 20      Vital Signs (Current): There were no vitals filed for this visit.                                            BP Readings from Last 3 Encounters:   02/04/20 118/78   09/03/19 116/60   01/14/19 130/68       NPO Status:                                                                                 BMI:   Wt Readings from Last 3 Encounters:   02/04/20 162 lb (73.5 kg)   09/03/19 168 lb 9.6 oz (76.5 kg)   01/14/19 167 lb (75.8 kg)     There is no height or weight on file to calculate BMI.    CBC:   Lab Results   Component Value Date/Time    WBC 7.1 01/31/2014 12:09 PM    RBC 4.36 01/31/2014 12:09 PM    HGB 13.6 01/31/2014 12:09 PM    HCT 41.4 01/31/2014 12:09 PM    MCV 94.8 01/31/2014 12:09 PM    RDW 14.0 01/31/2014 12:09 PM     01/31/2014 12:09 PM       CMP:   Lab Results   Component Value Date/Time     07/09/2018 04:51 PM    K 3.9 07/09/2018 04:51 PM     07/09/2018 04:51 PM    CO2 25 07/09/2018 04:51 PM    BUN 12 07/09/2018 04:51 PM    CREATININE 0.7 07/09/2018 04:51 PM    GFRAA >60 07/09/2018 04:51 PM    GFRAA >60 12/21/2009 04:36 PM    AGRATIO 2.1 07/09/2018 04:51 PM    LABGLOM >60 07/09/2018 04:51 PM    GLUCOSE 74 07/09/2018 04:51 PM    PROT 6.8 07/09/2018 04:51 PM    PROT 6.9 12/21/2009 04:36 PM    CALCIUM 10.1 07/09/2018 04:51 PM    BILITOT 1.3 07/09/2018 04:51 PM ALKPHOS 79 07/09/2018 04:51 PM    AST 21 07/09/2018 04:51 PM    ALT 19 07/09/2018 04:51 PM       POC Tests: No results for input(s): POCGLU, POCNA, POCK, POCCL, POCBUN, POCHEMO, POCHCT in the last 72 hours. Coags: No results found for: PROTIME, INR, APTT    HCG (If Applicable): No results found for: PREGTESTUR, PREGSERUM, HCG, HCGQUANT     ABGs: No results found for: PHART, PO2ART, PVE7ILY, CMJ8EHU, BEART, O3KQRJEY     Type & Screen (If Applicable):  No results found for: LABABO, LABRH    Drug/Infectious Status (If Applicable):  No results found for: HIV, HEPCAB    COVID-19 Screening (If Applicable): No results found for: COVID19        Anesthesia Evaluation  Patient summary reviewed and Nursing notes reviewed no history of anesthetic complications:   Airway: Mallampati: II  TM distance: >3 FB   Neck ROM: full  Mouth opening: > = 3 FB   Dental:          Pulmonary:Negative Pulmonary ROS                              Cardiovascular:Negative CV ROS                      Neuro/Psych:   (+) psychiatric history:            GI/Hepatic/Renal: Neg GI/Hepatic/Renal ROS            Endo/Other: Negative Endo/Other ROS                    Abdominal:             Vascular: negative vascular ROS. Other Findings:           Anesthesia Plan      general     ASA 3    (67-year-old female presents for colonoscopy. Plan general anesthesia with ASA standard monitors. Questions answered. Patient agreeable with anesthetic plan.  )  Induction: intravenous. Anesthetic plan and risks discussed with patient. Plan discussed with CRNA.     Attending anesthesiologist reviewed and agrees with Jm Hearn MD   7/26/2022

## 2022-07-26 NOTE — PROCEDURES
[facility] [place]  Colonoscopy Note    Patient: Kassie Pritchett  : 1950  Acct#:     Procedure: Colonoscopy with biopsy, polypectomy (cold snare)    Date:  2022    Surgeon:  Maryanne Perez MD    Referring Physician:  Landy Borden MD    Anesthesia: IV propofol, per anesthesia    EBL: <50 mL    Indications: This is a 66y.o. year old female who presents today with chronic diarrhea    Procedure: An informed consent was obtained from the patient after explanation of indications, benefits, possible risks and complications of the procedure. The patient was then taken to the endoscopy suite, placed in the left lateral decubitus position, and the above IV anesthesia was administered. A digital rectal examination was performed and revealed negative without mass, lesions or tenderness. The Olympus PCFQ-H190 video colonoscope was placed in the patient's rectum under digital direction and advanced to the cecum. The cecum was identified by characteristic anatomy and ballottment. The prep was adequate. Findings:  Normal terminal ileum. Diverticulosis. Normal colonic mucosa. Biopsies were obtained evaluate for microscopic colitis. 3 mm sessile polyp in the sigmoid colon removed via cold snare polypectomy. The scope was then withdrawn into the rectum and retroflexed. The retroflexed view of the anal verge and rectum demonstrates no hemorrhoids. The scope was straightened, the colon was decompressed and the scope was withdrawn from the patient. The patient tolerated the procedure well and was taken to the PACU in good condition. Biopsies:  No     Impression:   Normal terminal ileum. Diverticulosis. Normal colonic mucosa. Biopsies were obtained evaluate for microscopic colitis. 3 mm sessile polyp in the sigmoid colon removed via cold snare polypectomy. Recommendations:  Await biopsy results.   Repeat colonoscopy based on pathology results.     Yara Schmitt MD  [facility]

## 2022-07-26 NOTE — H&P
Gastroenterology Note                 Pre-operative History and Physical    Patient: Becky Calix  : 1950  CSN:     History Obtained From:   Patient or guardian. HISTORY OF PRESENT ILLNESS:    The patient is a 66 y.o. female here for colonoscopy for diarrhea and elevated inflammatory markers. Pt of Dr. Clarence Harrison     Past Medical History:    Past Medical History:   Diagnosis Date    Anxiety     Arthritis     Closed fracture of arm approx     Depression     Prolonged emergence from general anesthesia      Past Surgical History:    Past Surgical History:   Procedure Laterality Date    ACHILLES TENDON SURGERY      CARPAL TUNNEL RELEASE      CARPAL TUNNEL RELEASE      left     SECTION   /     COLONOSCOPY      COLONOSCOPY      COLONOSCOPY  2017    diverticulosis, polyp    DILATION AND CURETTAGE OF UTERUS      FINGER SURGERY  10/05/2017    Silastic proximal interphalangeal joint arthroplasty, distal interphalangeal joint fusion left ring     LAPAROSCOPY  x several    endometriosis    OTHER SURGICAL HISTORY  2017    excision lipoma right abdomen     Medications Prior to Admission:   No current facility-administered medications on file prior to encounter. Current Outpatient Medications on File Prior to Encounter   Medication Sig Dispense Refill    QUEtiapine (SEROQUEL) 25 MG tablet 2 times daily       VORTIoxetine (TRINTELLIX) 10 MG TABS tablet Take by mouth      guaiFENesin (MUCINEX) 600 MG extended release tablet 1-2 tablets of 600mg twice a day as needed. Increase water intake. (Patient not taking: Reported on 2022) 30 tablet 0    Glucosamine-Chondroitin (MOVE FREE PO) Take by mouth      clobetasol (TEMOVATE) 0.05 % ointment Apply topically 2 times daily Apply topically 2 times daily.  (Patient not taking: Reported on 2022)      Lutein 10 MG TABS Take by mouth      Cyanocobalamin (VITAMIN B 12 PO) Take by mouth      NONFORMULARY oreganol supplement      zinc 50 MG CAPS Take by mouth (Patient not taking: Reported on 7/26/2022)      NONFORMULARY       methylphenidate (RITALIN) 20 MG tablet Take 20 mg by mouth 3 times daily . Turmeric 500 MG CAPS Take by mouth 2 times daily       CINNAMON PO Take 1,000 mg by mouth 2 times daily  (Patient not taking: Reported on 7/26/2022)      RESVERATROL PO Take 500 mg by mouth 2 times daily       Fish Oil OIL Take 1 g by mouth 2 times daily  (Patient not taking: Reported on 7/26/2022)      valACYclovir (VALTREX) 500 MG tablet Take 500 mg by mouth daily       MELOXICAM PO Take 7.5 mg by mouth daily       KLONOPIN 0.5 MG tablet TAKE 1 TABLET BY MOUTH THREE TIMES A DAY 90 tablet 0    multivitamin (ANTIOXIDANT;PROSIGHT) TABS per tablet Take 1 tablet by mouth daily. Coenzyme Q10 100 MG CAPS Take  by mouth 2 times daily. Allergies:  Latex, Strattera [atomoxetine hcl], Viberzi [eluxadoline], and Wellbutrin [bupropion hcl]      Social History:   Social History     Tobacco Use    Smoking status: Every Day     Packs/day: 0.50     Years: 20.00     Pack years: 10.00     Types: Cigarettes    Smokeless tobacco: Never    Tobacco comments:      quit 1980 with hypnotism. quit for over a year . quit cigs again in May, 2016, but still occ cigarin 2007, but started back when lost job. 4/15/16 down to 1/4-1/2 ppd. stopped again in May, 2016 but still smokes occ cigar . no cigars since about July, 20   Substance Use Topics    Alcohol use:  Yes     Alcohol/week: 0.0 standard drinks     Comment: 3 times a year     Family History:   Family History   Problem Relation Age of Onset    High Blood Pressure Mother     Mental Illness Mother         manic-depressive    Heart Disease Father     Cancer Brother         sinus- required removal of sinus and eyeball/ socket    Other Brother         tumor on pancreas(not malignant) requiring removal    Anesth Problems Other     Heart Disease Other     High Blood Pressure Other Stroke Other     Diabetes Maternal Aunt     Cancer Maternal Aunt         breast    Cancer Maternal Grandmother         breast       PHYSICAL EXAM:      /66   Pulse 54   Temp 97 °F (36.1 °C) (Temporal)   Resp 15   Ht 5' 6\" (1.676 m)   Wt 145 lb (65.8 kg)   LMP 11/01/1998   SpO2 97%   Breastfeeding No   BMI 23.40 kg/m²  I        Heart:  RRR, normal s1s2    Lungs:  CTA and normal effort    Abdomen:   Soft, nt nd. ASSESSMENT AND PLAN:    1. Patient is a 66 y.o. female here for endoscopy with MAC sedation. 2.  Procedure options, risks and benefits reviewed with patient and/or guardian. They express understanding.      Sharmin Hunter MD  [address] [place] [address]

## 2022-07-27 PROBLEM — D73.5 PERISPLENIC HEMATOMA: Status: ACTIVE | Noted: 2022-07-27

## 2022-07-27 LAB
ABO/RH: NORMAL
ALBUMIN SERPL-MCNC: 4 G/DL (ref 3.4–5)
ANION GAP SERPL CALCULATED.3IONS-SCNC: 10 MMOL/L (ref 3–16)
ANTIBODY SCREEN: NORMAL
APTT: 32.5 SEC (ref 23–34.3)
BASOPHILS ABSOLUTE: 0 K/UL (ref 0–0.2)
BASOPHILS RELATIVE PERCENT: 0.4 %
BUN BLDV-MCNC: 11 MG/DL (ref 7–20)
CALCIUM SERPL-MCNC: 9.1 MG/DL (ref 8.3–10.6)
CHLORIDE BLD-SCNC: 103 MMOL/L (ref 99–110)
CO2: 24 MMOL/L (ref 21–32)
CREAT SERPL-MCNC: <0.5 MG/DL (ref 0.6–1.2)
EOSINOPHILS ABSOLUTE: 0.1 K/UL (ref 0–0.6)
EOSINOPHILS RELATIVE PERCENT: 1.7 %
GFR AFRICAN AMERICAN: >60
GFR NON-AFRICAN AMERICAN: >60
GLUCOSE BLD-MCNC: 83 MG/DL (ref 70–99)
GLUCOSE BLD-MCNC: 85 MG/DL (ref 70–99)
GLUCOSE BLD-MCNC: 89 MG/DL (ref 70–99)
HCT VFR BLD CALC: 32.7 % (ref 36–48)
HCT VFR BLD CALC: 33.4 % (ref 36–48)
HCT VFR BLD CALC: 34 % (ref 36–48)
HEMOGLOBIN: 11.1 G/DL (ref 12–16)
HEMOGLOBIN: 11.3 G/DL (ref 12–16)
HEMOGLOBIN: 11.5 G/DL (ref 12–16)
INR BLD: 1.11 (ref 0.87–1.14)
LYMPHOCYTES ABSOLUTE: 1.8 K/UL (ref 1–5.1)
LYMPHOCYTES RELATIVE PERCENT: 32 %
MAGNESIUM: 2 MG/DL (ref 1.8–2.4)
MCH RBC QN AUTO: 30.6 PG (ref 26–34)
MCHC RBC AUTO-ENTMCNC: 33.2 G/DL (ref 31–36)
MCV RBC AUTO: 92.1 FL (ref 80–100)
MONOCYTES ABSOLUTE: 0.5 K/UL (ref 0–1.3)
MONOCYTES RELATIVE PERCENT: 8.8 %
NEUTROPHILS ABSOLUTE: 3.3 K/UL (ref 1.7–7.7)
NEUTROPHILS RELATIVE PERCENT: 57.1 %
PDW BLD-RTO: 14.1 % (ref 12.4–15.4)
PERFORMED ON: NORMAL
PERFORMED ON: NORMAL
PHOSPHORUS: 3.2 MG/DL (ref 2.5–4.9)
PLATELET # BLD: 274 K/UL (ref 135–450)
PMV BLD AUTO: 7.3 FL (ref 5–10.5)
POTASSIUM SERPL-SCNC: 3.7 MMOL/L (ref 3.5–5.1)
PROTHROMBIN TIME: 14.2 SEC (ref 11.7–14.5)
RBC # BLD: 3.63 M/UL (ref 4–5.2)
SODIUM BLD-SCNC: 137 MMOL/L (ref 136–145)
WBC # BLD: 5.8 K/UL (ref 4–11)

## 2022-07-27 PROCEDURE — 96374 THER/PROPH/DIAG INJ IV PUSH: CPT

## 2022-07-27 PROCEDURE — 6360000002 HC RX W HCPCS: Performed by: NURSE PRACTITIONER

## 2022-07-27 PROCEDURE — 85018 HEMOGLOBIN: CPT

## 2022-07-27 PROCEDURE — 80069 RENAL FUNCTION PANEL: CPT

## 2022-07-27 PROCEDURE — 85730 THROMBOPLASTIN TIME PARTIAL: CPT

## 2022-07-27 PROCEDURE — 86850 RBC ANTIBODY SCREEN: CPT

## 2022-07-27 PROCEDURE — 36415 COLL VENOUS BLD VENIPUNCTURE: CPT

## 2022-07-27 PROCEDURE — 99223 1ST HOSP IP/OBS HIGH 75: CPT | Performed by: SURGERY

## 2022-07-27 PROCEDURE — 86901 BLOOD TYPING SEROLOGIC RH(D): CPT

## 2022-07-27 PROCEDURE — 6370000000 HC RX 637 (ALT 250 FOR IP): Performed by: INTERNAL MEDICINE

## 2022-07-27 PROCEDURE — 1200000000 HC SEMI PRIVATE

## 2022-07-27 PROCEDURE — 2580000003 HC RX 258: Performed by: INTERNAL MEDICINE

## 2022-07-27 PROCEDURE — 86900 BLOOD TYPING SEROLOGIC ABO: CPT

## 2022-07-27 PROCEDURE — 85025 COMPLETE CBC W/AUTO DIFF WBC: CPT

## 2022-07-27 PROCEDURE — 6360000002 HC RX W HCPCS: Performed by: EMERGENCY MEDICINE

## 2022-07-27 PROCEDURE — 85610 PROTHROMBIN TIME: CPT

## 2022-07-27 PROCEDURE — 6360000002 HC RX W HCPCS: Performed by: INTERNAL MEDICINE

## 2022-07-27 PROCEDURE — 2580000003 HC RX 258: Performed by: STUDENT IN AN ORGANIZED HEALTH CARE EDUCATION/TRAINING PROGRAM

## 2022-07-27 PROCEDURE — 85014 HEMATOCRIT: CPT

## 2022-07-27 PROCEDURE — 83735 ASSAY OF MAGNESIUM: CPT

## 2022-07-27 RX ORDER — MORPHINE SULFATE 2 MG/ML
2 INJECTION, SOLUTION INTRAMUSCULAR; INTRAVENOUS EVERY 4 HOURS PRN
Status: DISCONTINUED | OUTPATIENT
Start: 2022-07-27 | End: 2022-07-29 | Stop reason: HOSPADM

## 2022-07-27 RX ORDER — MULTIVIT-MIN/IRON/FOLIC ACID/K 18-600-40
1000 CAPSULE ORAL
COMMUNITY

## 2022-07-27 RX ORDER — POTASSIUM CHLORIDE 7.45 MG/ML
10 INJECTION INTRAVENOUS
Status: COMPLETED | OUTPATIENT
Start: 2022-07-27 | End: 2022-07-27

## 2022-07-27 RX ORDER — LANOLIN ALCOHOL/MO/W.PET/CERES
10 CREAM (GRAM) TOPICAL NIGHTLY PRN
COMMUNITY

## 2022-07-27 RX ORDER — MECOBALAMIN 5000 MCG
10 TABLET,DISINTEGRATING ORAL NIGHTLY
Status: DISCONTINUED | OUTPATIENT
Start: 2022-07-27 | End: 2022-07-29 | Stop reason: HOSPADM

## 2022-07-27 RX ORDER — QUETIAPINE FUMARATE 25 MG/1
25 TABLET, FILM COATED ORAL 2 TIMES DAILY
Status: DISCONTINUED | OUTPATIENT
Start: 2022-07-27 | End: 2022-07-29 | Stop reason: HOSPADM

## 2022-07-27 RX ORDER — CLONAZEPAM 0.5 MG/1
0.5 TABLET ORAL EVERY 8 HOURS PRN
Status: DISCONTINUED | OUTPATIENT
Start: 2022-07-27 | End: 2022-07-29 | Stop reason: HOSPADM

## 2022-07-27 RX ORDER — ACETAMINOPHEN 325 MG/1
650 TABLET ORAL EVERY 6 HOURS PRN
Status: DISCONTINUED | OUTPATIENT
Start: 2022-07-27 | End: 2022-07-29 | Stop reason: HOSPADM

## 2022-07-27 RX ORDER — ACETAMINOPHEN 650 MG/1
650 SUPPOSITORY RECTAL EVERY 6 HOURS PRN
Status: DISCONTINUED | OUTPATIENT
Start: 2022-07-27 | End: 2022-07-29 | Stop reason: HOSPADM

## 2022-07-27 RX ORDER — SODIUM CHLORIDE, SODIUM LACTATE, POTASSIUM CHLORIDE, CALCIUM CHLORIDE 600; 310; 30; 20 MG/100ML; MG/100ML; MG/100ML; MG/100ML
INJECTION, SOLUTION INTRAVENOUS CONTINUOUS
Status: DISCONTINUED | OUTPATIENT
Start: 2022-07-27 | End: 2022-07-28

## 2022-07-27 RX ORDER — SODIUM CHLORIDE 0.9 % (FLUSH) 0.9 %
5-40 SYRINGE (ML) INJECTION EVERY 12 HOURS SCHEDULED
Status: DISCONTINUED | OUTPATIENT
Start: 2022-07-27 | End: 2022-07-29 | Stop reason: HOSPADM

## 2022-07-27 RX ORDER — DEXTROSE MONOHYDRATE 100 MG/ML
INJECTION, SOLUTION INTRAVENOUS CONTINUOUS PRN
Status: DISCONTINUED | OUTPATIENT
Start: 2022-07-27 | End: 2022-07-29 | Stop reason: HOSPADM

## 2022-07-27 RX ORDER — ONDANSETRON 2 MG/ML
4 INJECTION INTRAMUSCULAR; INTRAVENOUS EVERY 6 HOURS PRN
Status: DISCONTINUED | OUTPATIENT
Start: 2022-07-27 | End: 2022-07-29 | Stop reason: HOSPADM

## 2022-07-27 RX ORDER — ONDANSETRON 4 MG/1
4 TABLET, ORALLY DISINTEGRATING ORAL EVERY 8 HOURS PRN
Status: DISCONTINUED | OUTPATIENT
Start: 2022-07-27 | End: 2022-07-29 | Stop reason: HOSPADM

## 2022-07-27 RX ORDER — SODIUM CHLORIDE 0.9 % (FLUSH) 0.9 %
5-40 SYRINGE (ML) INJECTION PRN
Status: DISCONTINUED | OUTPATIENT
Start: 2022-07-27 | End: 2022-07-29 | Stop reason: HOSPADM

## 2022-07-27 RX ORDER — PANTOPRAZOLE SODIUM 40 MG/1
40 TABLET, DELAYED RELEASE ORAL DAILY
COMMUNITY

## 2022-07-27 RX ORDER — ASCORBIC ACID 500 MG
1600 TABLET ORAL DAILY
COMMUNITY

## 2022-07-27 RX ORDER — SODIUM CHLORIDE 9 MG/ML
INJECTION, SOLUTION INTRAVENOUS PRN
Status: DISCONTINUED | OUTPATIENT
Start: 2022-07-27 | End: 2022-07-29 | Stop reason: HOSPADM

## 2022-07-27 RX ADMIN — MORPHINE SULFATE 2 MG: 2 INJECTION, SOLUTION INTRAMUSCULAR; INTRAVENOUS at 14:49

## 2022-07-27 RX ADMIN — POTASSIUM CHLORIDE 10 MEQ: 7.46 INJECTION, SOLUTION INTRAVENOUS at 11:15

## 2022-07-27 RX ADMIN — SODIUM CHLORIDE: 9 INJECTION, SOLUTION INTRAVENOUS at 09:31

## 2022-07-27 RX ADMIN — SODIUM CHLORIDE, PRESERVATIVE FREE 10 ML: 5 INJECTION INTRAVENOUS at 07:50

## 2022-07-27 RX ADMIN — MORPHINE SULFATE 2 MG: 2 INJECTION, SOLUTION INTRAMUSCULAR; INTRAVENOUS at 07:59

## 2022-07-27 RX ADMIN — SODIUM CHLORIDE, POTASSIUM CHLORIDE, SODIUM LACTATE AND CALCIUM CHLORIDE: 600; 310; 30; 20 INJECTION, SOLUTION INTRAVENOUS at 16:54

## 2022-07-27 RX ADMIN — QUETIAPINE FUMARATE 25 MG: 25 TABLET ORAL at 23:28

## 2022-07-27 RX ADMIN — MORPHINE SULFATE 4 MG: 4 INJECTION INTRAVENOUS at 00:48

## 2022-07-27 RX ADMIN — POTASSIUM CHLORIDE 10 MEQ: 7.46 INJECTION, SOLUTION INTRAVENOUS at 09:31

## 2022-07-27 RX ADMIN — Medication 10 MG: at 23:28

## 2022-07-27 ASSESSMENT — PAIN DESCRIPTION - LOCATION
LOCATION: ABDOMEN

## 2022-07-27 ASSESSMENT — PAIN SCALES - GENERAL
PAINLEVEL_OUTOF10: 5
PAINLEVEL_OUTOF10: 8
PAINLEVEL_OUTOF10: 4
PAINLEVEL_OUTOF10: 5

## 2022-07-27 ASSESSMENT — PAIN DESCRIPTION - FREQUENCY
FREQUENCY: CONTINUOUS
FREQUENCY: CONTINUOUS

## 2022-07-27 ASSESSMENT — PAIN DESCRIPTION - PAIN TYPE
TYPE: ACUTE PAIN

## 2022-07-27 ASSESSMENT — PAIN DESCRIPTION - DESCRIPTORS: DESCRIPTORS: SHARP

## 2022-07-27 NOTE — CONSULTS
GI Consult Note      Admission Date: 7/26/2022  Hospital Day: Hospital Day: 2  Attending: Chapincito Uriostegui MD  Date of service: 7/27/22    Subjective:     Chief complaint/ Reason for consult:   Left upper quadrant abdominal pain post colonoscopy suspected perisplenic hematoma    HPI: Sharron Martinez is a 66 y.o.  female patient, who was seen at the request of Dr. Chapincito Uriostegui MD.    History was obtained from chart review and the patient. 75-year-old female with history of anxiety, arthritis underwent a screening colonoscopy at 8 AM yesterday, following waking up from anesthesia complains of abdominal pain. Initially this was thought to be due to gas sensation with colonoscopy and patient tried conservative measures like walking around, laying in bed with no improvement. X-ray of the abdomen done in PACU revealed nonspecific gas pattern with diet related loops of bowel in the right hemiabdomen. Patient was discharged home. Patient is a modified diet at home for weight loss, lost approximately 30 pounds in the last couple of months. She reported she was unable to suck from a straw to drink her smoothie. Patient called GI who recommended to come to the hospital for CT evaluation. CT scan revealed left upper quadrant and perisplenic hyperdense fluid/hemorrhage. General surgery was consulted, recommended no acute surgical intervention. Patient was admitted to the hospital for close monitoring of H&H and serial abdominal exams. On my assessment, patient hemodynamically stable, Hb 11.5, reports improvement in pain but still significantly tender in LUQ and LLQ. Patient denies ASA, NSAID use.   Patient is not on anticoagulation   Narcotic use: Yes, for LUQ pain       Past Endoscopic History:  None                     Past Medical History:     Past Medical History:   Diagnosis Date    Anxiety     Arthritis     Closed fracture of arm approx 1987    Depression     Prolonged emergence from general anesthesia        Past Surgical History:    Past Surgical History:   Procedure Laterality Date    ACHILLES TENDON SURGERY      CARPAL TUNNEL RELEASE      CARPAL TUNNEL RELEASE      left     SECTION      COLONOSCOPY      COLONOSCOPY      COLONOSCOPY  2017    diverticulosis, polyp    COLONOSCOPY N/A 2022    COLONOSCOPY WITH BIOPSY performed by Aurelia Vasquez MD at [facility] 2022    COLONOSCOPY POLYPECTOMY SNARE/COLD BIOPSY performed by Aurelia Vasquez MD at [facility]  10/05/2017    Silastic proximal interphalangeal joint arthroplasty, distal interphalangeal joint fusion left ring     LAPAROSCOPY  x several    endometriosis    OTHER SURGICAL HISTORY  2017    excision lipoma right abdomen         Social History:     Tobacco use:   reports that she has been smoking cigarettes. She has a 20.00 pack-year smoking history. She has never used smokeless tobacco.  Alcohol use:   reports current alcohol use. Currently lives in: [facility]   reports no history of drug use. Lives alone      Family History:       Problem Relation Age of Onset    High Blood Pressure Mother     Mental Illness Mother         manic-depressive    Heart Disease Father     Cancer Brother         sinus- required removal of sinus and eyeball/ socket    Other Brother         tumor on pancreas(not malignant) requiring removal    Anesth Problems Other     Heart Disease Other     High Blood Pressure Other     Stroke Other     Diabetes Maternal Aunt     Cancer Maternal Aunt         breast    Cancer Maternal Grandmother         breast       There is no family history of colon cancer, IBD, or liver disease     Medications:    QUEtiapine  25 mg Oral BID    sodium chloride flush  5-40 mL IntraVENous 2 times per day            REVIEW OF SYSTEMS:       Pertinent items are noted in HPI.     Objective:   PHYSICAL EXAM: Vitals:   Vitals:    07/27/22 0445 07/27/22 0705 07/27/22 0829 07/27/22 1124   BP:  139/79  (!) 142/50   Pulse:  61  60   Resp:  17 15 15   Temp:  97.8 °F (36.6 °C)  97.8 °F (36.6 °C)   TempSrc:  Oral  Oral   SpO2:  94%  93%   Weight: 145 lb (65.8 kg)      Height: 5' 6\" (1.676 m)          General appearance: alert, cooperative, no distress, appears stated age  Eyes: Anicteric  Head: Normocephalic, without obvious abnormality  Lungs: clear to auscultation bilaterally, Normal Effort  Heart: regular rate and rhythm, normal S1 and S2, no murmurs or rubs  Abdomen: soft, tender LUQ. Bowel sounds normal. No masses,  no organomegaly. Extremities: atraumatic, no cyanosis or edema  Skin: warm and dry, no jaundice  Neuro: Grossly intact, A&OX3    Intake and output:   No intake/output data recorded. Lab Data:      CBC:   Recent Labs     07/26/22 2235 07/27/22  0553 07/27/22 0717   WBC 8.6 5.8  --    RBC 4.02 3.63*  --    HGB 12.3 11.1* 11.5*   HCT 37.0 33.4* 34.0*    274  --    MCV 92.0 92.1  --    MCH 30.6 30.6  --    MCHC 33.3 33.2  --    RDW 14.4 14.1  --         BMP:  Recent Labs     07/26/22 2235 07/27/22 0553    137   K 3.6 3.7    103   CO2 25 24   BUN 15 11   CREATININE <0.5* <0.5*   CALCIUM 9.4 9.1   GLUCOSE 90 89        Hepatic Function Panel:   Recent Labs     07/26/22 2235   AST 16   ALT 18   BILITOT 0.7   ALKPHOS 73       Recent Labs     07/26/22 2235   LIPASE 29.0     Recent Labs     07/27/22  0233   PROTIME 14.2   INR 1.11     No results for input(s): PTT in the last 72 hours. No results for input(s): OCCULTBLD in the last 72 hours. Imaging:    CT ABDOMEN PELVIS W IV CONTRAST Additional Contrast? None   Final Result      1. Left upper quadrant and perisplenic hyperdense fluid/hemorrhage. 2. Bilateral basilar atelectasis. 3. Degenerative changes lumbar spine. Known drug Allergies:    Allergies   Allergen Reactions    Latex     Strattera [Atomoxetine Hcl] Hair loss      Viberzi [Eluxadoline] Nausea Only    Wellbutrin [Bupropion Hcl] Swelling           Assessment:   The patient is a 66 y.o. old female  with following problems:    Principal Problem:    Perisplenic hematoma  Resolved Problems:    * No resolved hospital problems.  *    Splenic hematoma 2/2 splenic injury during her colonoscopy  HD stable   HB stable at 11.5     Recommendations:     - NPO, will defer advancing diet to general surgery   - H&H Q6H   - transfuse for HB < 7   - Pain management   - continuous IVF        Mina Parson MD  [address]   07/27/22

## 2022-07-27 NOTE — PLAN OF CARE
Problem: Discharge Planning  Goal: Discharge to home or other facility with appropriate resources  Outcome: Progressing Towards Goal     Problem: Pain  Goal: Verbalizes/displays adequate comfort level or baseline comfort level  7/27/2022 0752 by Allyson Stern RN  Outcome: Progressing Towards Goal     Problem: ABCDS Injury Assessment  Goal: Absence of physical injury  7/27/2022 0752 by Allyson Stern RN  Outcome: Progressing Towards Goal

## 2022-07-27 NOTE — ED PROVIDER NOTES
[address] [facility]          ATTENDING PHYSICIAN NOTE       Date of evaluation: 7/26/2022    Chief Complaint     Abdominal Pain (Had colonoscopy today, having abdominal pain since AMINA and LLQ. Denies having BM today, denies vomiting)      History of Present Illness     Ariane Roldan is a 66 y.o. female who presents with left lower and left upper quadrant abdominal pain. She had a colonoscopy performed earlier today and awoke from sedation with pain in her left upper quadrant. She was evaluated by her GI physician, Dr. Eileen Moreno, who obtained an acute abdominal series that was unremarkable. She states she was instructed to continue to monitor the pain and return if worsening or persistent for possible CT scan. She states the pain has not relented despite time. It has not worsened or been relieved by eating, though she has been able to tolerate some p.o. intake since the colonoscopy. She is passing flatus but has not had any bowel movements. No fevers. Per the medical record she did have a polyp removed in the sigmoid colon. Review of Systems     Pertinent positive and negative findings as documented in the HPI. Otherwise all other systems were reviewed and were negative. Physical Exam     INITIAL VITALS: BP: 134/75, Temp: 98 °F (36.7 °C), Heart Rate: 83, Resp: 17, SpO2: 98 %     Nursing note and vitals reviewed. General:  Adult female, alert and appropriately interactive. In no distress. HENT: Normocephalic and atraumatic. External ears normal. Nose appears normal externally. Eyes: Conjunctivae normal. No scleral icterus. Neck: Neck supple. No tracheal deviation present. CV: Normal rate. Regular rhythm. Pulm: Effort normal on room air. GI: Soft. Mild distension, slightly tympanic to percussion diffusely. Moderate LUQ and LLQ tenderness. No rebound or guarding. No masses. Musculoskeletal: No edema. No gross deformities.   Neurological: Alert and appropriately interactive. Face symmetric, speech without dysarthria or obvious aphasia. Moving all extremities spontaneously. Skin: Warm, dry. No rash. No diaphoresis or erythema. Psychiatric: Calm and cooperative with appropriate mood and affect. Procedures   Procedures    MEDICAL DECISION MAKING     MDM: Torres Oreilly is a 66 y.o. female with history as above presenting for persistent left-sided abdominal pain after colonoscopy earlier today. On my examination, she is in no distress and vital signs are reassuring. She does have left-sided tenderness and slight distention and tympany, though no rebound or guarding. She declined medications for pain or nausea. Given that she has been evaluated post procedurally for this with x-ray, I did feel the next most prudent step in the work-up would be to obtain a CT scan. This was ordered in addition to laboratory studies. These are pending at time of signout. Patient signed out to the oncoming physician, Dr. Ashkan Kilpatrick, pending these results and ultimate disposition. Clinical Impression     No diagnosis found. Disposition     DISPOSITION          Mark Dodge MD  9:51 PM                     Past Medical, Surgical, Family, and Social History     She has a past medical history of Anxiety, Arthritis, Closed fracture of arm, Depression, and Prolonged emergence from general anesthesia. She has a past surgical history that includes Carpal tunnel release; Achilles tendon surgery ();  section ( / ); Carpal tunnel release; laparoscopy (x several); Dilation and curettage of uterus; other surgical history (2017); Colonoscopy (); Colonoscopy; Colonoscopy (2017); Finger surgery (10/05/2017); Colonoscopy (N/A, 2022); and Colonoscopy (N/A, 2022). Her family history includes Anesth Problems in an other family member; Cancer in her brother, maternal aunt, and maternal grandmother; Diabetes in her maternal aunt;  Heart Disease in her father and another family member; High Blood Pressure in her mother and another family member; Mental Illness in her mother; Other in her brother; Stroke in an other family member. She reports that she has been smoking cigarettes. She has a 20.00 pack-year smoking history. She has never used smokeless tobacco. She reports current alcohol use. She reports that she does not use drugs. Medications     Previous Medications    CINNAMON PO    Take 1,000 mg by mouth 2 times daily     CLOBETASOL (TEMOVATE) 0.05 % OINTMENT    Apply topically 2 times daily Apply topically 2 times daily. COENZYME Q10 100 MG CAPS    Take  by mouth 2 times daily. CYANOCOBALAMIN (VITAMIN B 12 PO)    Take by mouth    FISH OIL OIL    Take 1 g by mouth 2 times daily     GLUCOSAMINE-CHONDROITIN (MOVE FREE PO)    Take by mouth    GUAIFENESIN (MUCINEX) 600 MG EXTENDED RELEASE TABLET    1-2 tablets of 600mg twice a day as needed. Increase water intake. KLONOPIN 0.5 MG TABLET    TAKE 1 TABLET BY MOUTH THREE TIMES A DAY    LUTEIN 10 MG TABS    Take by mouth    MELOXICAM PO    Take 7.5 mg by mouth daily     METHYLPHENIDATE (RITALIN) 20 MG TABLET    Take 20 mg by mouth 3 times daily . MULTIVITAMIN (ANTIOXIDANT;PROSIGHT) TABS PER TABLET    Take 1 tablet by mouth daily. NONFORMULARY        NONFORMULARY    oreganol  supplement    QUETIAPINE (SEROQUEL) 25 MG TABLET    2 times daily     RESVERATROL PO    Take 500 mg by mouth 2 times daily     TURMERIC 500 MG CAPS    Take by mouth 2 times daily     VALACYCLOVIR (VALTREX) 500 MG TABLET    Take 500 mg by mouth daily     VORTIOXETINE (TRINTELLIX) 10 MG TABS TABLET    Take by mouth    ZINC 50 MG CAPS    Take by mouth       Allergies     She is allergic to latex, strattera [atomoxetine hcl], viberzi [eluxadoline], and wellbutrin [bupropion hcl]. ED Course     Nursing Notes, Past Medical Hx, Past Surgical Hx, Social Hx,Allergies, and Family Hx were reviewed.     Patient was given the following

## 2022-07-27 NOTE — PROGRESS NOTES
General Surgery Significant Event Note     Patient seen and examined for interval abdominal exam. She is HDS with recent Hgb stable at 11. Patient reports improving pain with pain medications. On exam, she is sitting in bed comfortably, mildly tender in LUQ and LLQ. She has some voluntary guarding but no rebound or rigidity.      Will continue to monitor with serial exams   Will follow serial H/H   Will discuss when escalatio n of diet is appropriate

## 2022-07-27 NOTE — CARE COORDINATION
Case Management Assessment           Initial Evaluation                Date / Time of Evaluation: 7/27/2022 4:23 PM                 Assessment Completed by: MOLLY Garcia    Patient Name: Eduardo Juárez     YOB: 1950  Diagnosis: Perisplenic hematoma [D73.5]     Date / Time: 7/26/2022  8:00 PM    Patient Admission Status: Inpatient    If patient is discharged prior to next notation, then this note serves as note for discharge by case management. Current PCP: Kashmir Ge Patient: No    Chart Reviewed: Yes  Patient/ Family Interviewed: Yes    Initial assessment completed at bedside with: Patient    Hospitalization in the last 30 days: No    Emergency Contacts:  Extended Emergency Contact Information  Primary Emergency Contact: Larry Hidalgo  Address: [address] Phone: [phone]  Mobile Phone: [phone]  Relation: Domestic Partner  Secondary Emergency Contact: [address] Phone: [phone]  Relation: Child    Advance Directives:   Code Status: Full [address]: No    Financial  Payor: MEDICARE / Plan: MEDICARE PART A AND B / Product Type: *No Product type* /     Pre-cert required for SNF: No    Pharmacy    [facility], [address] [address] [phone] - F [phone]  [address]  [address] [facility] [address]  Phone: [phone] Fax: [phone] [place] [address]  Phone: [phone] Fax: [phone]. Annabel Llanos [phone] - F [phone]  [address] [address]  Phone: [phone] Fax: [phone]      Potential assistance Purchasing Medications: Potential Assistance Purchasing Medications: No  Does Patient want to participate in local refill/ meds to beds program?:      Meds To Beds General Rules:  1. Can ONLY be done Monday- Friday between 8:30am-5pm  2. Prescription(s) must be in pharmacy by 3pm to be filled same day  3. Copy of patient's insurance/ prescription drug card and patient face sheet must be sent along with the prescription(s)  4. Cost of Rx cannot be added to hospital bill. If financial assistance is needed, please contact unit  or ;  or  CANNOT provide pharmacy voucher for patients co-pays  5. Patients can then  the prescription on their way out of the hospital at discharge, or pharmacy can deliver to the bedside if staff is available. (payment due at time of pick-up or delivery - cash, check, or card accepted)     Able to afford home medications/ co-pay costs: Yes    ADLS  Support Systems: Spouse/Significant Other, Children    PT AM-PAC:   /24  OT AM-PAC:   /24    [place]: ranch  Steps: n/a    Plans to RETURN to current housing: Yes  Barriers to RETURNING to current housing: medical stability    Home Care Information  Currently ACTIVE with [address]: No  Home Care Agency: Not Applicable    Durable Medical Equipment  DME Provider: n/a  Equipment: n/a    Home Oxygen and [address] prior to admission: No    DISCHARGE PLAN:  Disposition: Home- No Services Needed    Transportation PLAN for discharge: family       Additional Case Management Notes: SW met with pt and her fiance at bedside. Pt reports she just moved in with her fiance, [facility] [employer]. Pt reports she still has her home in [place], also a ranch. Pt reports no prior home health care, no DME. Anticipated discharge plan is home, pending further workup (GI consult, defer advance diet to general surgery) and therapy recs.     The Plan for Transition of Care is related to the following treatment goals of Perisplenic hematoma [D73.5]    The Patient and/or patient representative Ariane and her family were provided with a choice of provider and agrees with the discharge plan Yes    Freedom of choice list was provided with basic dialogue that supports the patient's individualized plan of care/goals and shares the quality data associated with the providers.  Yes    Care Transition patient: No    MOLLY Lynn  [facility]  Case Management Department  Ph: [phone]   Fax: [phone]

## 2022-07-27 NOTE — PLAN OF CARE
Problem: Pain  Goal: Verbalizes/displays adequate comfort level or baseline comfort level  Outcome: Progressing Towards Goal   Patient complains of pain, patient medicated per mar, will continue to monitor. Problem: ABCDS Injury Assessment  Goal: Absence of physical injury  Outcome: Progressing Towards Goal   Patient is A&O x4, VSS. Patient is up x1 with a walker. Patient has remained free of falls. Patient is currently in bed with the bed alarm. Bed is in the lowest position and wheels are locked. Patient calls out accordingly. Call light and bed side table are within reach. Will continue to monitor and assess.

## 2022-07-27 NOTE — CONSULTS
General Surgery   Resident Consult Note    Reason for Consult: Left upper quadrant and perisplenic hyperdense fluid on CT    History of Present Illness:   Олег Rodriguez is a 66 y.o. female who underwent colonoscopy yesterday and awoke from sedation with left upper quadrant pain. Acute abdominal series taken at that time was unremarkable. Pt reported to ED as directed by Dr. Harsha Conti after pain did not subside after several hours. The patient states she went home and had increasing abdominal pain after the pain started when she woke up after her colonoscopy. She denies any nausea or vomiting. She does endorse passing gas with no bowel movements. She states the pain is largely in her left upper quadrant with some radiation to her left lower quadrant. Patient denies any blood thinner use but does endorse meloxicam usage. She states she held the medication a few days prior to the colonoscopy but did take 1 dose earlier today.     Past Medical History:        Diagnosis Date    Anxiety     Arthritis     Closed fracture of arm approx     Depression     Prolonged emergence from general anesthesia        Past Surgical History:        Procedure Laterality Date    ACHILLES TENDON SURGERY      CARPAL TUNNEL RELEASE      CARPAL TUNNEL RELEASE      left     SECTION   /     COLONOSCOPY      COLONOSCOPY      COLONOSCOPY  2017    diverticulosis, polyp    COLONOSCOPY N/A 2022    COLONOSCOPY WITH BIOPSY performed by Chucky Aldrich MD at [facility] 2022    COLONOSCOPY POLYPECTOMY SNARE/COLD BIOPSY performed by Chucky Aldrich MD at [address]  10/05/2017    Silastic proximal interphalangeal joint arthroplasty, distal interphalangeal joint fusion left ring     LAPAROSCOPY  x several    endometriosis    OTHER SURGICAL HISTORY  2017    excision lipoma right abdomen       Allergies:  Latex, Strattera [atomoxetine hcl], Viberzi [eluxadoline], and Wellbutrin [bupropion hcl]    Medications:   Home Meds  No current facility-administered medications on file prior to encounter. Current Outpatient Medications on File Prior to Encounter   Medication Sig Dispense Refill    QUEtiapine (SEROQUEL) 25 MG tablet 2 times daily       VORTIoxetine (TRINTELLIX) 10 MG TABS tablet Take by mouth      guaiFENesin (MUCINEX) 600 MG extended release tablet 1-2 tablets of 600mg twice a day as needed. Increase water intake. (Patient not taking: Reported on 7/26/2022) 30 tablet 0    Glucosamine-Chondroitin (MOVE FREE PO) Take by mouth      clobetasol (TEMOVATE) 0.05 % ointment Apply topically 2 times daily Apply topically 2 times daily. (Patient not taking: Reported on 7/26/2022)      Lutein 10 MG TABS Take by mouth      Cyanocobalamin (VITAMIN B 12 PO) Take by mouth      NONFORMULARY oreganol  supplement      zinc 50 MG CAPS Take by mouth (Patient not taking: Reported on 7/26/2022)      NONFORMULARY       methylphenidate (RITALIN) 20 MG tablet Take 20 mg by mouth 3 times daily . Turmeric 500 MG CAPS Take by mouth 2 times daily       CINNAMON PO Take 1,000 mg by mouth 2 times daily  (Patient not taking: Reported on 7/26/2022)      RESVERATROL PO Take 500 mg by mouth 2 times daily       Fish Oil OIL Take 1 g by mouth 2 times daily  (Patient not taking: Reported on 7/26/2022)      valACYclovir (VALTREX) 500 MG tablet Take 500 mg by mouth daily       MELOXICAM PO Take 7.5 mg by mouth daily       KLONOPIN 0.5 MG tablet TAKE 1 TABLET BY MOUTH THREE TIMES A DAY 90 tablet 0    multivitamin (ANTIOXIDANT;PROSIGHT) TABS per tablet Take 1 tablet by mouth daily. Coenzyme Q10 100 MG CAPS Take  by mouth 2 times daily. Current Meds  No current facility-administered medications for this encounter.       Family History:   Family History   Problem Relation Age of Onset    High Blood Pressure Mother     Mental Illness Mother manic-depressive    Heart Disease Father     Cancer Brother         sinus- required removal of sinus and eyeball/ socket    Other Brother         tumor on pancreas(not malignant) requiring removal    Anesth Problems Other     Heart Disease Other     High Blood Pressure Other     Stroke Other     Diabetes Maternal Aunt     Cancer Maternal Aunt         breast    Cancer Maternal Grandmother         breast       Social History:   TOBACCO:   reports that she has been smoking cigarettes. She has a 20.00 pack-year smoking history. She has never used smokeless tobacco.  ETOH:   reports current alcohol use. DRUGS:   reports no history of drug use. Review of Systems:   A 14 point review of systems was conducted, significant findings as noted in HPI. All other systems negative. Physical exam:    Vitals:    07/26/22 1838 07/26/22 2057   BP: 134/75 120/71   Pulse: 83 55   Resp: 17 18   Temp: 98 °F (36.7 °C)    TempSrc: Oral    SpO2: 98% 100%       General appearance: alert, no acute distress, grooming appropriate  Eyes: No scleral icterus, EOM grossly intact  Neck: trachea midline, no JVD, no lymphadenopathy, neck supple  Chest/Lungs: Symmetrical chest rise, normal effort with no accessory muscle use on RA  Cardiovascular: RRR, well perfused  Abdomen: Soft, mildly tender in left upper quadrant, nonperitoneal, no rebound tenderness, no distention  Extremities: no edema, no cyanosis  Genitourinary: Grossly normal  Neuro: A&Ox3, no focal deficits, sensation intact    Labs:    CBC:   Recent Labs     07/26/22  2235   WBC 8.6   HGB 12.3   HCT 37.0   MCV 92.0        BMP:   Recent Labs     07/26/22  2235      K 3.6      CO2 25   BUN 15   CREATININE <0.5*     PT/INR: No results for input(s): PROTIME, INR in the last 72 hours. APTT: No results for input(s): APTT in the last 72 hours.   Liver Profile:   Lab Results   Component Value Date/Time    AST 16 07/26/2022 10:35 PM    ALT 18 07/26/2022 10:35 PM    BILIDIR reviewed with radiologist and they agree that it is likely based on Hounsfield units and proximity to spleen. There are no signs of free air or fluid suggestive succus or colonic perforation. No surgical intervention indicated at this time. - Commend admission to medical service for GI consultation for continuity of care  - Recommend every 6 hemoglobins  - NPO for now to ensure stability of hemoglobins and no further bleeding  -Continue to monitor abdominal exam  - If the patient were to worsen or become hemodynamically unstable patient will need rescan with CTA and consultation to interventional radiology. - Patient discussed with Dr. Philly Hadley who agrees with the above plan.     Riddhi Burgos DO  07/27/22  1:19 AM

## 2022-07-27 NOTE — PROGRESS NOTES
Patient admitted to room 5521. Patient is AAOx4. VSS. Patient is up SBA. Patient complains of slight pain but states it is better at this time. Patient instructed on call light and NPO order. Patient is resting in bed with all fall precautions in place. Will continue to monitor.

## 2022-07-27 NOTE — PROGRESS NOTES
Patient alert and oriented x4. Complains of pain to ABD. PRN morphine administered. Continue NPO except ice chips. No plans for surgery. Monitor H&H. Possible D/C juanita.

## 2022-07-27 NOTE — H&P
Hospital Medicine History & Physical      PCP: Lauren Nash    Date of Admission: 7/26/2022    Date of Service: Pt seen/examined on 07/27/22 and Admitted to Inpatient with expected LOS greater than two midnights due to medical therapy. Chief Complaint:  abdominal pain      History Of Present Illness:     77-year-old female history of anxiety, arthritis  Underwent screening colonoscopy at 8:30 AM this morning, since she woke up from anesthesia complains of abdominal pain. This was thought to be due to gas inflation with colonoscopy, she tried conservative measures like walking around, bearing down with no improvement. X-ray of the abdomen was done in the PACU which showed nonspecific gas pattern with dilated loops of bowel in the right hemiabdomen. She was discharged home  She is on modified diet at home for weight loss, says she is lost 30 pounds in last couple of months, she will tolerate to meals 2 hours apart without nausea or vomiting. She states she is supposed to take a smoothie which is some puréed food, but she was unable to suck from the straw as could not. Negative pressure in the abdomen. She called GI office who recommended come to the hospital for CT evaluation. In the emergency room she was afebrile, hemodynamically stable no tachycardia blood pressure systolic 064J over diastolic 30L. CT showed left upper quadrant and perisplenic hyperdense fluid concerning for hemorrhage. General surgery resident saw the patient, did not feel acute surgical intervention is indicated. Patient admitted to hospital for close monitoring of H&H and abdominal exams.     Past Medical History:          Diagnosis Date    Anxiety     Arthritis     Closed fracture of arm approx 1987    Depression     Prolonged emergence from general anesthesia        Past Surgical History:          Procedure Laterality Date    ACHILLES TENDON SURGERY  9-06    CARPAL TUNNEL RELEASE      CARPAL TUNNEL RELEASE      left  SECTION   /     COLONOSCOPY      COLONOSCOPY      COLONOSCOPY  2017    diverticulosis, polyp    COLONOSCOPY N/A 2022    COLONOSCOPY WITH BIOPSY performed by Mark Singh MD at [facility] 2022    COLONOSCOPY POLYPECTOMY SNARE/COLD BIOPSY performed by Mark Singh MD at [address]  10/05/2017    Silastic proximal interphalangeal joint arthroplasty, distal interphalangeal joint fusion left ring     LAPAROSCOPY  x several    endometriosis    OTHER SURGICAL HISTORY  2017    excision lipoma right abdomen       Medications Prior to Admission:      Prior to Admission medications    Medication Sig Start Date End Date Taking? Authorizing Provider   melatonin 3 MG TABS tablet Take 10 mg by mouth nightly as needed   Yes Historical Provider, MD   pantoprazole (PROTONIX) 40 MG tablet Take 40 mg by mouth in the morning. Yes Historical Provider, MD   Cholecalciferol (VITAMIN D) 50 MCG (2000 UT) CAPS capsule Take 1,000 Units by mouth 45mcg of K2 with this   Yes Historical Provider, MD   vitamin C (ASCORBIC ACID) 500 MG tablet Take 1,600 mg by mouth in the morning. Takes 4 pills each are 1600mg. Yes Historical Provider, MD   QUEtiapine (SEROQUEL) 25 MG tablet 2 times daily  10/16/18   Historical Provider, MD   VORTIoxetine (TRINTELLIX) 10 MG TABS tablet Take by mouth    Historical Provider, MD   guaiFENesin (MUCINEX) 600 MG extended release tablet 1-2 tablets of 600mg twice a day as needed. Increase water intake. Patient not taking: No sig reported 19   DEION Latham   Glucosamine-Chondroitin (MOVE FREE PO) Take by mouth    Historical Provider, MD   clobetasol (TEMOVATE) 0.05 % ointment Apply topically 2 times daily Apply topically 2 times daily.   Patient not taking: No sig reported    Historical Provider, MD   Lutein 10 MG TABS Take by mouth    Historical Provider, MD   Cyanocobalamin (VITAMIN B 12 PO) Take by mouth    Historical ProviderMD BOJORQUEZ oreganol  supplement    Historical Provider, MD   zinc 50 MG CAPS Take by mouth  Patient not taking: No sig reported    Historical Provider, MD   NONFORMULARY     Historical Provider, MD   methylphenidate (RITALIN) 20 MG tablet Take 20 mg by mouth 3 times daily . Historical Provider, MD   Turmeric 500 MG CAPS Take by mouth 2 times daily     Historical Provider, MD   CINNAMON PO Take 1,000 mg by mouth 2 times daily   Patient not taking: No sig reported    Historical Provider, MD   RESVERATROL PO Take 500 mg by mouth 2 times daily     Historical Provider, MD   Fish Oil OIL Take 1 g by mouth 2 times daily   Patient not taking: No sig reported    Historical Provider, MD   valACYclovir (VALTREX) 500 MG tablet Take 500 mg by mouth daily  11/6/15   Historical Provider, MD   MELOXICAM PO Take 7.5 mg by mouth 2 times daily    Historical Provider, MD   KLONOPIN 0.5 MG tablet TAKE 1 TABLET BY MOUTH THREE TIMES A DAY 7/16/12   Jone Brown MD   multivitamin (ANTIOXIDANT;PROSIGHT) TABS per tablet Take 1 tablet by mouth daily. Historical Provider, MD   Coenzyme Q10 100 MG CAPS Take  by mouth 2 times daily. Historical Provider, MD       Allergies:  Latex, Strattera [atomoxetine hcl], Viberzi [eluxadoline], and Wellbutrin [bupropion hcl]    Social History:      The patient currently lives at home    TOBACCO:   reports that she has been smoking cigarettes. She has a 20.00 pack-year smoking history. She has never used smokeless tobacco.  ETOH:   reports current alcohol use.       Family History:    reviewed        Problem Relation Age of Onset    High Blood Pressure Mother     Mental Illness Mother         manic-depressive    Heart Disease Father     Cancer Brother         sinus- required removal of sinus and eyeball/ socket    Other Brother         tumor on pancreas(not malignant) requiring removal    Anesth Problems Other     Heart Disease Other     High Blood Pressure Other     Stroke Other     Diabetes Maternal Aunt     Cancer Maternal Aunt         breast    Cancer Maternal Grandmother         breast       REVIEW OF SYSTEMS:   Pertinent positives as noted in the HPI. All other systems reviewed and negative. PHYSICAL EXAM PERFORMED:    /75   Pulse 58   Temp 98 °F (36.7 °C) (Oral)   Resp 16   Ht 5' 6\" (1.676 m)   Wt 145 lb (65.8 kg)   LMP 11/01/1998   SpO2 93%   BMI 23.40 kg/m²     General appearance:  moderate to severe distress  HEENT:  Normal cephalic, atraumatic without obvious deformity. Pupils equal, round, and reactive to light. Extra ocular muscles intact. Conjunctivae/corneas clear. Neck: Supple, with full range of motion. No jugular venous distention. Trachea midline. Respiratory:  Normal respiratory effort. Clear to auscultation, bilaterally without Rales/Wheezes/Rhonchi. Cardiovascular:  Regular rate and rhythm with normal S1/S2 without murmurs, rubs or gallops. Abdomen: Soft, LUQ tenderness, no guarding, BS +  Musculoskeletal:  No clubbing, cyanosis or edema bilaterally. Full range of motion without deformity. Skin: Skin color, texture, turgor normal.  No rashes or lesions. Neurologic:  Neurovascularly intact without any focal sensory/motor deficits.  Cranial nerves: II-XII intact, grossly non-focal.  Psychiatric:  Alert and oriented, thought content appropriate, normal insight  Capillary Refill: Brisk,< 3 seconds   Peripheral Pulses: +2 palpable, equal bilaterally       Labs:     Recent Labs     07/26/22 2235 07/27/22  0553   WBC 8.6 5.8   HGB 12.3 11.1*   HCT 37.0 33.4*    274     Recent Labs     07/26/22 2235 07/27/22  0553    137   K 3.6 3.7    103   CO2 25 24   BUN 15 11   CREATININE <0.5* <0.5*   CALCIUM 9.4 9.1   PHOS  --  3.2     Recent Labs     07/26/22 2235   AST 16   ALT 18   BILITOT 0.7   ALKPHOS 73     Recent Labs     07/27/22  0233   INR 1.11     No results for input(s): CKTOTAL, TROPONINI in the last 72 hours. Urinalysis:      Lab Results   Component Value Date/Time    NITRU Negative 10/04/2013 01:11 AM    WBCUA 0-2 10/04/2013 01:11 AM    BACTERIA Rare 10/04/2013 01:11 AM    RBCUA 3-5 10/04/2013 01:11 AM    BLOODU trace-intact 07/28/2017 04:58 PM    BLOODU TRACE-LYSED 10/04/2013 01:11 AM    SPECGRAV 1.005 07/28/2017 04:58 PM    SPECGRAV <=1.005 10/04/2013 01:11 AM    GLUCOSEU neg 07/28/2017 04:58 PM    GLUCOSEU Negative 10/04/2013 01:11 AM       Radiology:     CXR: I have reviewed the CXR with the following interpretation: n/a  EKG:  I have reviewed the EKG with the following interpretation: n/a    CT ABDOMEN PELVIS W IV CONTRAST Additional Contrast? None   Final Result      1. Left upper quadrant and perisplenic hyperdense fluid/hemorrhage. 2. Bilateral basilar atelectasis. 3. Degenerative changes lumbar spine. ASSESSMENT/PLAN:    Active Hospital Problems    Diagnosis Date Noted    Perisplenic hematoma [D73.5] 07/27/2022     Priority: Medium     Abdominal pain suspect due to perisplenic and left upper quadrant post colonoscopy hemorrhage  Acute blood loss anemia  She is hemodynamically stable  Hemoglobin 12.3 on admission, a couple of days back it was 12.9  Monitor H&H every 6 hours  Serial abdominal exams be done by surgery  GI consulted for evaluation may need colonoscopy for endoscopic examination  If she bleeds may need CTA and IR intervention  Keep patient n.p.o.  Morphine for pain control    Anxiety/psych; continue Seroquel 25 twice daily home dose    DVT Prophylaxis: SCD  Diet: Diet NPO Exceptions are: Ice Chips, Sips of Water with Meds  Code Status: Full Code    PT/OT Eval Status: n/a    Dispo - Admit as inpatient. I anticipate hospitalization spanning more than two midnights for investigation and treatment of the above medically necessary diagnoses.     Alen Welch MD  Internal medicine hospitalist    Thank you [facility] for the opportunity to be involved

## 2022-07-27 NOTE — ED PROVIDER NOTES
[address] [facility]          ATTENDING PHYSICIAN NOTE       Date of evaluation: 7/26/2022    ADDENDUM:      Care of this patient was assumed from Dr. Natasha Burton. The patient was seen for Abdominal Pain (Had colonoscopy today, having abdominal pain since AMINA and LLQ. Denies having BM today, denies vomiting)  . The patient's initial evaluation and plan have been discussed with the prior provider who initially evaluated the patient. Nursing Notes, Past Medical Hx, Past Surgical Hx, Social Hx, Allergies, and Family Hx were all reviewed. Patient is a 68-year-old female who presents complaining of left-sided abdominal pain. Patient had colonoscopy performed earlier today with polypectomy of a polyp in the sigmoid colon. She states that when she awoke from sedation she did have pain on her left side and they did an x-ray that was unremarkable. Patient was discharged home but states that she continued have pain so was told that this continues to come to the emerge apartment. On arrival, patient is hemodynamically stable. She does have tenderness to palpation on the left side of the abdomen. At time of turnover, laboratory studies and imaging was pending. Diagnostic Results       RADIOLOGY:  CT ABDOMEN PELVIS W IV CONTRAST Additional Contrast? None   Final Result      1. Left upper quadrant and perisplenic hyperdense fluid/hemorrhage. 2. Bilateral basilar atelectasis. 3. Degenerative changes lumbar spine.              LABS:   Results for orders placed or performed during the hospital encounter of 07/26/22   CMP   Result Value Ref Range    Sodium 137 136 - 145 mmol/L    Potassium 3.6 3.5 - 5.1 mmol/L    Chloride 101 99 - 110 mmol/L    CO2 25 21 - 32 mmol/L    Anion Gap 11 3 - 16    Glucose 90 70 - 99 mg/dL    BUN 15 7 - 20 mg/dL    Creatinine <0.5 (L) 0.6 - 1.2 mg/dL    GFR Non-African American >60 >60    GFR African American >60 >60    Calcium 9.4 8.3 - 10.6 mg/dL    Total hyperdense fluid consistent with hemorrhage. With this finding, I am concerned for a perisplenic hematoma as a complication of her colonoscopy. I did speak with GI to make them aware of this. I did contact general surgery who evaluated the patient. They stated since the patient has stable hemoglobin and a benign abdominal exam, there is no indication for emergent surgical intervention. They did recommend every 6 hour hemoglobins and they will follow but recommended medicine admission and GI consultation. Patient will be admitted to the hospital service for further management. Clinical Impression     1. Perisplenic hematoma        Disposition     PATIENT REFERRED TO:  No follow-up provider specified.     DISCHARGE MEDICATIONS:  New Prescriptions    No medications on file       DISPOSITION Decision To Admit 07/27/2022 01:00:10 AM         Aurelia Pereyra MD  07/27/22 [id number]

## 2022-07-28 LAB
ALBUMIN SERPL-MCNC: 4 G/DL (ref 3.4–5)
ANION GAP SERPL CALCULATED.3IONS-SCNC: 10 MMOL/L (ref 3–16)
BASOPHILS ABSOLUTE: 0 K/UL (ref 0–0.2)
BASOPHILS RELATIVE PERCENT: 0.6 %
BUN BLDV-MCNC: 8 MG/DL (ref 7–20)
CALCIUM SERPL-MCNC: 9.2 MG/DL (ref 8.3–10.6)
CHLORIDE BLD-SCNC: 105 MMOL/L (ref 99–110)
CO2: 26 MMOL/L (ref 21–32)
CREAT SERPL-MCNC: 0.5 MG/DL (ref 0.6–1.2)
EOSINOPHILS ABSOLUTE: 0.1 K/UL (ref 0–0.6)
EOSINOPHILS RELATIVE PERCENT: 1.7 %
GFR AFRICAN AMERICAN: >60
GFR NON-AFRICAN AMERICAN: >60
GLUCOSE BLD-MCNC: 108 MG/DL (ref 70–99)
GLUCOSE BLD-MCNC: 77 MG/DL (ref 70–99)
GLUCOSE BLD-MCNC: 78 MG/DL (ref 70–99)
GLUCOSE BLD-MCNC: 93 MG/DL (ref 70–99)
HCT VFR BLD CALC: 32.9 % (ref 36–48)
HCT VFR BLD CALC: 33.6 % (ref 36–48)
HCT VFR BLD CALC: 33.8 % (ref 36–48)
HEMOGLOBIN: 10.8 G/DL (ref 12–16)
HEMOGLOBIN: 11.1 G/DL (ref 12–16)
HEMOGLOBIN: 11.1 G/DL (ref 12–16)
LYMPHOCYTES ABSOLUTE: 1.5 K/UL (ref 1–5.1)
LYMPHOCYTES RELATIVE PERCENT: 28 %
MAGNESIUM: 2 MG/DL (ref 1.8–2.4)
MCH RBC QN AUTO: 30.7 PG (ref 26–34)
MCHC RBC AUTO-ENTMCNC: 33 G/DL (ref 31–36)
MCV RBC AUTO: 93 FL (ref 80–100)
MONOCYTES ABSOLUTE: 0.5 K/UL (ref 0–1.3)
MONOCYTES RELATIVE PERCENT: 9.9 %
NEUTROPHILS ABSOLUTE: 3.2 K/UL (ref 1.7–7.7)
NEUTROPHILS RELATIVE PERCENT: 59.8 %
PDW BLD-RTO: 14.2 % (ref 12.4–15.4)
PERFORMED ON: ABNORMAL
PERFORMED ON: NORMAL
PERFORMED ON: NORMAL
PHOSPHORUS: 2.8 MG/DL (ref 2.5–4.9)
PLATELET # BLD: 263 K/UL (ref 135–450)
PMV BLD AUTO: 7.6 FL (ref 5–10.5)
POTASSIUM SERPL-SCNC: 3.9 MMOL/L (ref 3.5–5.1)
RBC # BLD: 3.61 M/UL (ref 4–5.2)
SODIUM BLD-SCNC: 141 MMOL/L (ref 136–145)
WBC # BLD: 5.4 K/UL (ref 4–11)

## 2022-07-28 PROCEDURE — 99231 SBSQ HOSP IP/OBS SF/LOW 25: CPT | Performed by: SURGERY

## 2022-07-28 PROCEDURE — 80069 RENAL FUNCTION PANEL: CPT

## 2022-07-28 PROCEDURE — 2500000003 HC RX 250 WO HCPCS: Performed by: STUDENT IN AN ORGANIZED HEALTH CARE EDUCATION/TRAINING PROGRAM

## 2022-07-28 PROCEDURE — 1200000000 HC SEMI PRIVATE

## 2022-07-28 PROCEDURE — 6370000000 HC RX 637 (ALT 250 FOR IP): Performed by: INTERNAL MEDICINE

## 2022-07-28 PROCEDURE — 83735 ASSAY OF MAGNESIUM: CPT

## 2022-07-28 PROCEDURE — 36415 COLL VENOUS BLD VENIPUNCTURE: CPT

## 2022-07-28 PROCEDURE — 2580000003 HC RX 258: Performed by: INTERNAL MEDICINE

## 2022-07-28 PROCEDURE — 2580000003 HC RX 258: Performed by: STUDENT IN AN ORGANIZED HEALTH CARE EDUCATION/TRAINING PROGRAM

## 2022-07-28 PROCEDURE — 6360000002 HC RX W HCPCS: Performed by: INTERNAL MEDICINE

## 2022-07-28 PROCEDURE — 85025 COMPLETE CBC W/AUTO DIFF WBC: CPT

## 2022-07-28 PROCEDURE — 85014 HEMATOCRIT: CPT

## 2022-07-28 PROCEDURE — 85018 HEMOGLOBIN: CPT

## 2022-07-28 RX ORDER — METHYLPHENIDATE HYDROCHLORIDE 20 MG/1
20 TABLET ORAL 3 TIMES DAILY
Status: DISCONTINUED | OUTPATIENT
Start: 2022-07-28 | End: 2022-07-29 | Stop reason: HOSPADM

## 2022-07-28 RX ORDER — METHYLPHENIDATE HYDROCHLORIDE 5 MG/1
20 TABLET ORAL 3 TIMES DAILY
Status: DISCONTINUED | OUTPATIENT
Start: 2022-07-28 | End: 2022-07-28 | Stop reason: SDUPTHER

## 2022-07-28 RX ORDER — BUDESONIDE 3 MG/1
9 CAPSULE, COATED PELLETS ORAL DAILY
Status: DISCONTINUED | OUTPATIENT
Start: 2022-07-28 | End: 2022-07-29 | Stop reason: HOSPADM

## 2022-07-28 RX ORDER — PANTOPRAZOLE SODIUM 40 MG/1
40 TABLET, DELAYED RELEASE ORAL
Status: DISCONTINUED | OUTPATIENT
Start: 2022-07-29 | End: 2022-07-29 | Stop reason: HOSPADM

## 2022-07-28 RX ADMIN — BUDESONIDE 9 MG: 3 CAPSULE, GELATIN COATED ORAL at 20:57

## 2022-07-28 RX ADMIN — CLONAZEPAM 0.5 MG: 0.5 TABLET ORAL at 13:45

## 2022-07-28 RX ADMIN — Medication 10 MG: at 23:20

## 2022-07-28 RX ADMIN — POTASSIUM PHOSPHATE, MONOBASIC AND POTASSIUM PHOSPHATE, DIBASIC 10 MMOL: 224; 236 INJECTION, SOLUTION, CONCENTRATE INTRAVENOUS at 10:18

## 2022-07-28 RX ADMIN — ONDANSETRON 4 MG: 2 INJECTION INTRAMUSCULAR; INTRAVENOUS at 11:08

## 2022-07-28 RX ADMIN — QUETIAPINE FUMARATE 25 MG: 25 TABLET ORAL at 23:20

## 2022-07-28 RX ADMIN — SODIUM CHLORIDE, PRESERVATIVE FREE 10 ML: 5 INJECTION INTRAVENOUS at 10:01

## 2022-07-28 RX ADMIN — SODIUM CHLORIDE, PRESERVATIVE FREE 10 ML: 5 INJECTION INTRAVENOUS at 23:21

## 2022-07-28 RX ADMIN — METHYLPHENIDATE HYDROCHLORIDE 20 MG: 20 TABLET ORAL at 13:42

## 2022-07-28 ASSESSMENT — PAIN DESCRIPTION - DESCRIPTORS: DESCRIPTORS: ACHING;SHARP

## 2022-07-28 ASSESSMENT — PAIN DESCRIPTION - ORIENTATION: ORIENTATION: LEFT;LOWER

## 2022-07-28 ASSESSMENT — PAIN SCALES - GENERAL: PAINLEVEL_OUTOF10: 4

## 2022-07-28 ASSESSMENT — PAIN DESCRIPTION - LOCATION: LOCATION: ABDOMEN

## 2022-07-28 ASSESSMENT — PAIN DESCRIPTION - PAIN TYPE: TYPE: ACUTE PAIN

## 2022-07-28 ASSESSMENT — PAIN DESCRIPTION - FREQUENCY: FREQUENCY: CONTINUOUS

## 2022-07-28 NOTE — PROGRESS NOTES
General Surgery Serial Abdominal Exam:    Patient seen and examined for interval abdominal exam. She is HDS with recent Hgb stable at 11.3. Patient reports improving pain with pain medications. On exam, she is sitting in bed comfortably, mildly tender in LUQ without voluntary guarding but no rebound or rigidity.      Will continue to monitor with serial exams   Will follow serial H/H   Will consider CLD tomorrow    Chilo Peñaloza MD  General Surgery Resident  07/27/22 11:13 PM  [phone]

## 2022-07-28 NOTE — PROGRESS NOTES
Hospitalist Progress Note      PCP: Edward Frye    Date of Admission: 7/26/2022        Subjective:     Patient feels nauseous this morning. .  Reports left upper quadrant pain with position changes      Medications:  Reviewed    Infusion Medications    sodium chloride 10 mL/hr at 07/27/22 0931    dextrose      lactated ringers 100 mL/hr at 07/27/22 1654     Scheduled Medications    potassium phosphate IVPB  10 mmol IntraVENous Once    QUEtiapine  25 mg Oral BID    sodium chloride flush  5-40 mL IntraVENous 2 times per day    melatonin  10 mg Oral Nightly     PRN Meds: clonazePAM, sodium chloride flush, sodium chloride, ondansetron **OR** ondansetron, acetaminophen **OR** acetaminophen, morphine, glucose, dextrose bolus **OR** dextrose bolus, glucagon (rDNA), dextrose    No intake or output data in the 24 hours ending 07/28/22 0905    Physical Exam Performed:    BP (!) 96/56   Pulse 63   Temp 97.3 °F (36.3 °C) (Oral)   Resp 18   Ht 5' 6\" (1.676 m)   Wt 145 lb (65.8 kg)   LMP 11/01/1998   SpO2 96%   BMI 23.40 kg/m²     General appearance: No apparent distress, appears stated age and cooperative. HEENT: Pupils equal, round, and reactive to light. Conjunctivae/corneas clear. Neck: Supple, with full range of motion. No jugular venous distention. Trachea midline. Respiratory:  Normal respiratory effort. Clear to auscultation, bilaterally without Rales/Wheezes/Rhonchi. Cardiovascular: Regular rate and rhythm with normal S1/S2 without murmurs, rubs or gallops. Abdomen: Soft, non-tender, non-distended with normal bowel sounds. Musculoskeletal: No clubbing, cyanosis or edema bilaterally. Full range of motion without deformity. Skin: Skin color, texture, turgor normal.  No rashes or lesions. Neurologic:  Neurovascularly intact without any focal sensory/motor deficits.  Cranial nerves: II-XII intact, grossly non-focal.  Psychiatric: Alert and oriented, thought content appropriate, normal insight  Capillary Refill: Brisk,3 seconds, normal   Peripheral Pulses: +2 palpable, equal bilaterally       Labs:   Recent Labs     07/26/22 2235 07/27/22  0553 07/27/22  0717 07/27/22  1908 07/27/22  2327 07/28/22  0525   WBC 8.6 5.8  --   --   --  5.4   HGB 12.3 11.1*   < > 11.3* 11.1* 11.1*   HCT 37.0 33.4*   < > 32.7* 33.8* 33.6*    274  --   --   --  263    < > = values in this interval not displayed. Recent Labs     07/26/22 2235 07/27/22  0553 07/28/22  0525    137 141   K 3.6 3.7 3.9    103 105   CO2 25 24 26   BUN 15 11 8   CREATININE <0.5* <0.5* 0.5*   CALCIUM 9.4 9.1 9.2   PHOS  --  3.2 2.8     Recent Labs     07/26/22 2235   AST 16   ALT 18   BILITOT 0.7   ALKPHOS 73     Recent Labs     07/27/22  0233   INR 1.11     No results for input(s): Curlie Lat in the last 72 hours. Urinalysis:      Lab Results   Component Value Date/Time    NITRU Negative 10/04/2013 01:11 AM    WBCUA 0-2 10/04/2013 01:11 AM    BACTERIA Rare 10/04/2013 01:11 AM    RBCUA 3-5 10/04/2013 01:11 AM    BLOODU trace-intact 07/28/2017 04:58 PM    BLOODU TRACE-LYSED 10/04/2013 01:11 AM    SPECGRAV 1.005 07/28/2017 04:58 PM    SPECGRAV <=1.005 10/04/2013 01:11 AM    GLUCOSEU neg 07/28/2017 04:58 PM    GLUCOSEU Negative 10/04/2013 01:11 AM       Radiology:  CT ABDOMEN PELVIS W IV CONTRAST Additional Contrast? None   Final Result      1. Left upper quadrant and perisplenic hyperdense fluid/hemorrhage. 2. Bilateral basilar atelectasis. 3. Degenerative changes lumbar spine. Assessment/Plan:    -Perisplenic hematoma with acute left upper quadrant pain s/p outpatient colonoscopy--- continue conservative management as H&H remained stable. .  Advance diet, continue pain management. .  General surgery and GI consults appreciated    -Acute blood loss anemia--mild--H&H is stable-continue to monitor    -Anxiety/depression-continue Seroquel          DVT Prophylaxis: scd  Diet: ADULT DIET;  Clear Liquid  Code Status:

## 2022-07-28 NOTE — PROGRESS NOTES
General Surgery   Daily Progress Note  Patient: Myke Mcconnell      CC: Left upper quadrant and perisplenic hyperdense fluid on CT    SUBJECTIVE:   Patient rested well overnight. Remained HDS. Abdominal pain is improved. ROS:   A 14 point review of systems was conducted, significant findings as noted above. All other systems negative. OBJECTIVE:    PHYSICAL EXAM:    Vitals:    07/27/22 1741 07/27/22 1830 07/27/22 2328 07/28/22 0344   BP: (!) 105/50 107/62 104/60 (!) 96/41   Pulse: 56 58 60 60   Resp: 16 18 16 16   Temp: 98.2 °F (36.8 °C) 98.1 °F (36.7 °C) 98 °F (36.7 °C) 97.8 °F (36.6 °C)   TempSrc: Oral Oral Oral Oral   SpO2: 93% 92% 93% 94%   Weight:       Height:           General appearance: alert, no acute distress  Eyes: No scleral icterus, EOM grossly intact  Neck: trachea midline, no JVD, neck supple  Chest/Lungs: normal effort with no accessory muscle use, no signs of respiratory distress, on RA  Cardiovascular: Pulses equal b/l 2/4, no cyanosis noted,   Abdomen: Soft, mild tenderness in LLQ, non distended, non rigid, no guarding  Skin: warm and dry, no rashes  Extremities: no edema, no cyanosis  Genitourinary: Grossly normal  Neuro: A&Ox3, no focal deficits, sensation intact    LABS:   Recent Labs     07/27/22  0553 07/27/22  0717 07/27/22  2327 07/28/22  0525   WBC 5.8  --   --  5.4   HGB 11.1*   < > 11.1* 11.1*   HCT 33.4*   < > 33.8* 33.6*   MCV 92.1  --   --  93.0     --   --  263    < > = values in this interval not displayed.         Recent Labs     07/27/22  0553 07/28/22  0525    141   K 3.7 3.9    105   CO2 24 26   PHOS 3.2 2.8   BUN 11 8   CREATININE <0.5* 0.5*        Recent Labs     07/26/22 2235   AST 16   ALT 18   BILITOT 0.7   ALKPHOS 73        Recent Labs     07/26/22 2235   LIPASE 29.0        Recent Labs     07/26/22 2235 07/27/22  0233   PROT 6.6  --    INR  --  1.11   APTT  --  32.5      No results for input(s): CKTOTAL, CKMB, CKMBINDEX, TROPONINI in the last 72 hours. ASSESSMENT & PLAN:   This is a 66 y.o. female with fluid collection around the spleen found on CT s/p colonoscopy. There was no evidence of extravasation from the spleen, no free air, and no free fluid suggestive of bowel perforation. Given imaging, stable labs, and physical exam, no surgical intervention is indicated at this time.     - continue IVF  - will advance diet to CLD, advance as tolerated  - FU HgB, ok to space out to q12  - GI following    Lisa Redman DO  PGY1, General Surgery  07/28/22  6:45 AM  [facility]  Pager: [phone]

## 2022-07-28 NOTE — PLAN OF CARE
Problem: Discharge Planning  Goal: Discharge to home or other facility with appropriate resources  7/28/2022 1442 by Leon Rojas RN  Outcome: Progressing     Problem: Pain  Goal: Verbalizes/displays adequate comfort level or baseline comfort level  7/28/2022 1442 by Leon Rojas RN  Outcome: Progressing     Problem: Safety - Adult  Goal: Free from fall injury  Outcome: Progressing

## 2022-07-28 NOTE — PROGRESS NOTES
Patient A&Ox4, VSS. Patient continues to report intermittent abdominal pain and an episode of nausea x1,medicated per [facility] with relief. Patient and spouse instructed to call out for needs. Will continue to monitor.

## 2022-07-28 NOTE — CARE COORDINATION
Social Work: Discussed in Interdisciplinary Rounds:    SW following. Pt admitted from home with fiance, independent prior to admission. Anticipated discharge plan is home in the morning with no needs. Family will transport at discharge. SW to follow.     Carol Rico, MOLLY  [phone]

## 2022-07-28 NOTE — PROGRESS NOTES
[facility] GI  Gastroenterology Progress Note  Susan Myers is a 66 y.o. female patient. 1. Perisplenic hematoma        SUBJECTIVE:    Abdominal pain much improved. No bleeding    Physical    VITALS:  /67   Pulse 64   Temp 98 °F (36.7 °C) (Oral)   Resp 17   Ht 5' 6\" (1.676 m)   Wt 145 lb (65.8 kg)   LMP 1998   SpO2 93%   BMI 23.40 kg/m²   TEMPERATURE:  Current - Temp: 98 °F (36.7 °C); Max - Temp  Av.7 °F (36.5 °C)  Min: 97.3 °F (36.3 °C)  Max: 98 °F (36.7 °C)    Abdomen soft, ND, NT, no HSM, Bowel sounds normal     Data      Recent Labs     22  0553 22  0717 22  2327 22  0525 22  1109   WBC 8.6 5.8  --   --  5.4  --    HGB 12.3 11.1*   < > 11.1* 11.1* 10.8*   HCT 37.0 33.4*   < > 33.8* 33.6* 32.9*   MCV 92.0 92.1  --   --  93.0  --     274  --   --  263  --     < > = values in this interval not displayed. Recent Labs     22  0553 22  0525    137 141   K 3.6 3.7 3.9    103 105   CO2 25 24 26   PHOS  --  3.2 2.8   BUN 15 11 8   CREATININE <0.5* <0.5* 0.5*     Recent Labs     22   AST 16   ALT 18   BILITOT 0.7   ALKPHOS 73     Recent Labs     22   LIPASE 29.0             ASSESSMENT   Impression:  Splenic hematoma after colonoscopy--pain controlled with meds and hgb stable. Abdom pain much improved. No current indication/need for surgical/radiological intervention. LUQ AP d/t above--signif improved  Lymphocytic colitis on pathology results from colonoscopy which explains her diarrhea        PLAN     1. Budesonide 9 mg daily. Upon discharge she can take this medication for 4 weeks for her lymphocytic colitis.     Wes Armstrong MD  [address] [place] [address]

## 2022-07-29 VITALS
DIASTOLIC BLOOD PRESSURE: 68 MMHG | OXYGEN SATURATION: 95 % | HEIGHT: 66 IN | TEMPERATURE: 97.5 F | RESPIRATION RATE: 18 BRPM | HEART RATE: 60 BPM | WEIGHT: 145 LBS | SYSTOLIC BLOOD PRESSURE: 124 MMHG | BODY MASS INDEX: 23.3 KG/M2

## 2022-07-29 LAB
ALBUMIN SERPL-MCNC: 3.7 G/DL (ref 3.4–5)
ANION GAP SERPL CALCULATED.3IONS-SCNC: 11 MMOL/L (ref 3–16)
BASOPHILS ABSOLUTE: 0.1 K/UL (ref 0–0.2)
BASOPHILS RELATIVE PERCENT: 0.9 %
BUN BLDV-MCNC: 13 MG/DL (ref 7–20)
CALCIUM SERPL-MCNC: 9.8 MG/DL (ref 8.3–10.6)
CHLORIDE BLD-SCNC: 104 MMOL/L (ref 99–110)
CO2: 24 MMOL/L (ref 21–32)
CREAT SERPL-MCNC: <0.5 MG/DL (ref 0.6–1.2)
EOSINOPHILS ABSOLUTE: 0.1 K/UL (ref 0–0.6)
EOSINOPHILS RELATIVE PERCENT: 1.3 %
GFR AFRICAN AMERICAN: >60
GFR NON-AFRICAN AMERICAN: >60
GLUCOSE BLD-MCNC: 119 MG/DL (ref 70–99)
HCT VFR BLD CALC: 32.6 % (ref 36–48)
HCT VFR BLD CALC: 33.9 % (ref 36–48)
HEMOGLOBIN: 11.3 G/DL (ref 12–16)
HEMOGLOBIN: 11.4 G/DL (ref 12–16)
LYMPHOCYTES ABSOLUTE: 1.3 K/UL (ref 1–5.1)
LYMPHOCYTES RELATIVE PERCENT: 21.1 %
MAGNESIUM: 2.1 MG/DL (ref 1.8–2.4)
MCH RBC QN AUTO: 31.6 PG (ref 26–34)
MCHC RBC AUTO-ENTMCNC: 35 G/DL (ref 31–36)
MCV RBC AUTO: 90.2 FL (ref 80–100)
MONOCYTES ABSOLUTE: 0.5 K/UL (ref 0–1.3)
MONOCYTES RELATIVE PERCENT: 7.5 %
NEUTROPHILS ABSOLUTE: 4.4 K/UL (ref 1.7–7.7)
NEUTROPHILS RELATIVE PERCENT: 69.2 %
PDW BLD-RTO: 14.3 % (ref 12.4–15.4)
PHOSPHORUS: 4 MG/DL (ref 2.5–4.9)
PLATELET # BLD: 260 K/UL (ref 135–450)
PMV BLD AUTO: 7.5 FL (ref 5–10.5)
POTASSIUM SERPL-SCNC: 4.3 MMOL/L (ref 3.5–5.1)
RBC # BLD: 3.61 M/UL (ref 4–5.2)
SODIUM BLD-SCNC: 139 MMOL/L (ref 136–145)
WBC # BLD: 6.3 K/UL (ref 4–11)

## 2022-07-29 PROCEDURE — 99231 SBSQ HOSP IP/OBS SF/LOW 25: CPT | Performed by: SURGERY

## 2022-07-29 PROCEDURE — 6370000000 HC RX 637 (ALT 250 FOR IP): Performed by: HOSPITALIST

## 2022-07-29 PROCEDURE — 6370000000 HC RX 637 (ALT 250 FOR IP): Performed by: INTERNAL MEDICINE

## 2022-07-29 PROCEDURE — 83735 ASSAY OF MAGNESIUM: CPT

## 2022-07-29 PROCEDURE — 36415 COLL VENOUS BLD VENIPUNCTURE: CPT

## 2022-07-29 PROCEDURE — 85025 COMPLETE CBC W/AUTO DIFF WBC: CPT

## 2022-07-29 PROCEDURE — 80069 RENAL FUNCTION PANEL: CPT

## 2022-07-29 PROCEDURE — 2580000003 HC RX 258: Performed by: INTERNAL MEDICINE

## 2022-07-29 RX ORDER — BUDESONIDE 3 MG/1
9 CAPSULE, COATED PELLETS ORAL DAILY
Qty: 90 CAPSULE | Refills: 2 | Status: SHIPPED | OUTPATIENT
Start: 2022-07-30 | End: 2022-07-29 | Stop reason: SDUPTHER

## 2022-07-29 RX ORDER — BUDESONIDE 3 MG/1
9 CAPSULE, COATED PELLETS ORAL DAILY
Qty: 84 CAPSULE | Refills: 0 | Status: SHIPPED | OUTPATIENT
Start: 2022-07-30 | End: 2022-08-27

## 2022-07-29 RX ORDER — OXYCODONE HYDROCHLORIDE AND ACETAMINOPHEN 5; 325 MG/1; MG/1
1 TABLET ORAL EVERY 4 HOURS PRN
Qty: 20 TABLET | Refills: 0 | Status: SHIPPED | OUTPATIENT
Start: 2022-07-29 | End: 2022-08-03

## 2022-07-29 RX ADMIN — CLONAZEPAM 0.5 MG: 0.5 TABLET ORAL at 09:51

## 2022-07-29 RX ADMIN — BUDESONIDE 9 MG: 3 CAPSULE, GELATIN COATED ORAL at 09:51

## 2022-07-29 RX ADMIN — ONDANSETRON 4 MG: 4 TABLET, ORALLY DISINTEGRATING ORAL at 09:51

## 2022-07-29 RX ADMIN — QUETIAPINE FUMARATE 25 MG: 25 TABLET ORAL at 09:50

## 2022-07-29 RX ADMIN — METHYLPHENIDATE HYDROCHLORIDE 20 MG: 20 TABLET ORAL at 09:57

## 2022-07-29 RX ADMIN — PANTOPRAZOLE SODIUM 40 MG: 40 TABLET, DELAYED RELEASE ORAL at 09:51

## 2022-07-29 RX ADMIN — SODIUM CHLORIDE, PRESERVATIVE FREE 10 ML: 5 INJECTION INTRAVENOUS at 09:53

## 2022-07-29 NOTE — CARE COORDINATION
Case Management Assessment            Discharge Note                    Date / Time of Note: 7/29/2022 10:40 AM                  Discharge Note Completed by: MOLLY Ivey    Patient Name: Yfn Baires   YOB: 1950  Diagnosis: Perisplenic hematoma [D73.5]   Date / Time: 7/26/2022  8:00 PM    Current PCP: [facility] Ge patient: No    Hospitalization in the last 30 days: Yes    Advance Directives:  Code Status: Full Code  [place] DNR form completed and on chart: Not Indicated    Financial:  Payor: MEDICARE / Plan: MEDICARE PART A AND B / Product Type: *No Product type* /      Pharmacy:    [facility], [address] [address] [phone] - F [phone]  [address]  [address] [facility] [address]  Phone: [phone] Fax: [phone]    [facility] [address] [address]  Phone: [phone] Fax: [phone] - 02 [address]. [facility] [phone] - f [phone]  [facility]. [address] [place] [address]  Phone: [phone] Fax: [phone]      Assistance purchasing medications?: Potential Assistance Purchasing Medications: No  Assistance provided by Case Management: None at this time    Does patient want to participate in local refill/ meds to beds program?: Yes    Meds To Beds General Rules:  1. Can ONLY be done Monday- Friday between 8:30am-5pm  2. Prescription(s) must be in pharmacy by 3pm to be filled same day  3. Copy of patient's insurance/ prescription drug card and patient face sheet must be sent along with the prescription(s)  4. Cost of Rx cannot be added to hospital bill. If financial assistance is needed, please contact unit  or ;  or  CANNOT provide pharmacy voucher for patients co-pays  5.  Patients can then  the prescription on their way out of the hospital at discharge, or pharmacy can deliver to the bedside if staff is available. (payment due at time of pick-up or delivery - cash, check, or card accepted)     Able to afford home medications/ co-pay costs: Yes    ADLS:  Current PT AM-PAC Score:   /24  Current OT AM-PAC Score:   /24      DISCHARGE Disposition: Home- No Services Needed    LOC at discharge: Not Applicable  ELE Completed: Not Indicated    Notification completed in HENS/PAS?:  Not Applicable    IMM Completed:   Not Indicated    Transportation:  Transportation PLAN for discharge: family   Mode of Transport: [address] ordered at discharge: Not [address]: Not Applicable  Orders faxed: No    Durable Medical Equipment:  DME Provider: n/a  Equipment obtained during hospitalization: n/a    Home Oxygen and Respiratory Equipment:  Oxygen needed at discharge?: Not [address]: Not Applicable  Portable tank available for discharge?: Not Indicated    Dialysis:  Dialysis patient: No    Dialysis Center:  Not Applicable    Additional CM Notes: Per rounds, pt is scheduled to DC home today, no needs, family to transport. The Plan for Transition of Care is related to the following treatment goals of Perisplenic hematoma [D73.5]    The Patient and/or patient representative Ariane and her family were provided with a choice of provider and agrees with the discharge plan Yes    Freedom of choice list was provided with basic dialogue that supports the patient's individualized plan of care/goals and shares the quality data associated with the providers.  Not Indicated    Care Transitions patient: No    MOLLY York  [facility] [employer]  Case Management Department  Ph: [phone]  Fax: [phone]

## 2022-07-29 NOTE — PLAN OF CARE
Problem: Discharge Planning  Goal: Discharge to home or other facility with appropriate resources  7/29/2022 0041 by Brenda Arita RN  Outcome: Progressing  7/28/2022 1442 by Torrence Dancer, RN  Outcome: Progressing     Problem: Pain  Goal: Verbalizes/displays adequate comfort level or baseline comfort level  7/29/2022 0041 by Brenda Arita RN  Outcome: Progressing  7/28/2022 1442 by Torrence Dancer, RN  Outcome: Progressing     Problem: ABCDS Injury Assessment  Goal: Absence of physical injury  Outcome: Progressing     Problem: Safety - Adult  Goal: Free from fall injury  7/29/2022 0041 by Brenda Arita RN  Outcome: Progressing  7/28/2022 1442 by Torrence Dancer, RN  Outcome: Progressing

## 2022-07-29 NOTE — PROGRESS NOTES
1. 11   APTT  --  32.5             ASSESSMENT & PLAN:   This is a 66 y.o. female with fluid collection around the spleen found on CT s/p colonoscopy. There was no evidence of extravasation from the spleen, no free air, and no free fluid suggestive of bowel perforation. Given imaging, stable labs, and physical exam, no surgical intervention is indicated at this time. - continue regular diet  - HgB 11.4 this AM from 11.3  - GI following  - Dispo: likely home today    Luz Mace MD  PGY1, General Surgery  07/29/22  6:56 AM  [facility]#[phone]    ATTENDING ATTESTATION    Patient independently examined. Management discussed and I agree with resident/midlevel provider documentation. CC: Perisplenic hematoma after colonoscopy    Overall feeling better. Still with mild left upper quadrant pain. Tolerating diet. Vitals:    07/29/22 0625   BP: (!) 95/49   Pulse: 84   Resp: 18   Temp: 97.9 °F (36.6 °C)   SpO2: 94%       Abd: Soft, very minimally tender left upper quadrant  Labs reviewed, incl CBC, BMP  Pathology results reviewed from colonoscopy  Discussed case with GI physician    Perisplenic hematoma after colonoscopy  Has been stable throughout hospitalization, very unlikely to require any additional intervention  Budesonide started for microscopic colitis    Likely discharge later today. Follow-up with GI as an outpatient; no need to follow-up with me if continues to do well.     Cristine Avelar MD

## 2022-07-29 NOTE — DISCHARGE SUMMARY
Hospital Discharge Summary    Patient's PCP: Joni Kristieevan Date: 7/26/2022   Discharge Date: 7/29/2022    Admitting Physician: Dr. Rome Lipscomb MD  Discharge Physician: Dr. Fabiola Dunlap MD   Consults: GI and general surgery    Brief HPI:     66-year-old female history of anxiety, arthritis  Underwent screening colonoscopy at 8:30 AM this morning, since she woke up from anesthesia complains of abdominal pain. This was thought to be due to gas inflation with colonoscopy, she tried conservative measures like walking around, bearing down with no improvement. X-ray of the abdomen was done in the PACU which showed nonspecific gas pattern with dilated loops of bowel in the right hemiabdomen. She was discharged home  She is on modified diet at home for weight loss, says she is lost 30 pounds in last couple of months, she will tolerate to meals 2 hours apart without nausea or vomiting. She states she is supposed to take a smoothie which is some puréed food, but she was unable to suck from the straw as could not. Negative pressure in the abdomen. She called GI office who recommended come to the hospital for CT evaluation. In the emergency room she was afebrile, hemodynamically stable no tachycardia blood pressure systolic 512J over diastolic 55V. CT showed left upper quadrant and perisplenic hyperdense fluid concerning for hemorrhage. General surgery resident saw the patient, did not feel acute surgical intervention is indicated. Patient admitted to hospital for close monitoring of H&H and abdominal exams    Brief hospital course:     -Perisplenic hematoma with acute left upper quadrant pain s/p outpatient colonoscopy--- managed conservatively without intervention . Sherryle Moder H&H remained stable . continue pain management. .       -Acute blood loss anemia--mild--H&H remained stable-     -Anxiety/depression-continue Seroquel       Lymphocytic colitis on pathology results from colonoscopy which explains her diarrhea--started on budenoside x4 weeks--follow-up with GI    Invasive procedures:  None    Discharge Diagnoses:   Principal Problem:    Perisplenic hematoma  Resolved Problems:    * No resolved hospital problems. *      Physical Exam: /68   Pulse 60   Temp 97.5 °F (36.4 °C) (Oral)   Resp 18   Ht 5' 6\" (1.676 m)   Wt 145 lb (65.8 kg)   LMP 11/01/1998   SpO2 95%   BMI 23.40 kg/m²   Gen/overall appearance: Not in acute distress. Alert. Head: Normocephalic, atraumatic  Eyes: EOMI, good acuity  ENT:- Oral mucosa moist  Neck: No JVD, thyromegaly  CVS: Nml S1S2, no MRG, RRR  Pulm: Clear bilaterally. No crackles/wheezes  Gastrointestinal: Soft, NT/ND, +BS  Musculoskeletal: No edema. Warm  Neuro: No focal deficit. Moves extremity spontaneously. Psychiatry: Appropriate affect. Not agitated. Skin: Warm, dry with normal turgor. No rash        Significant Diagnostic Studies:    See above      Treatments: As above. Discharge Medications:     Medication List        START taking these medications      budesonide 3 MG extended release capsule  Commonly known as: ENTOCORT EC  Take 3 capsules by mouth in the morning for 28 days. Start taking on: July 30, 2022     oxyCODONE-acetaminophen 5-325 MG per tablet  Commonly known as: Percocet  Take 1 tablet by mouth every 4 hours as needed for Pain for up to 5 days. Intended supply: 5 days.  Take lowest dose possible to manage pain            CONTINUE taking these medications      coenzyme Q10 100 MG Caps capsule     KlonoPIN 0.5 MG tablet  Generic drug: clonazePAM  TAKE 1 TABLET BY MOUTH THREE TIMES A DAY     Lutein 10 MG Tabs     melatonin 3 MG Tabs tablet     methylphenidate 20 MG tablet  Commonly known as: RITALIN     MOVE FREE PO     multivitamin Tabs per tablet     * NONFORMULARY     * NONFORMULARY     pantoprazole 40 MG tablet  Commonly known as: PROTONIX     QUEtiapine 25 MG tablet  Commonly known as: SEROQUEL     RESVERATROL PO     turmeric 500 MG Caps valACYclovir 500 MG tablet  Commonly known as: VALTREX     VITAMIN B 12 PO     vitamin C 500 MG tablet  Commonly known as: ASCORBIC ACID     vitamin D 50 MCG (2000 UT) Caps capsule     VORTIoxetine 10 MG Tabs tablet  Commonly known as: TRINTELLIX           * This list has 2 medication(s) that are the same as other medications prescribed for you. Read the directions carefully, and ask your doctor or other care provider to review them with you. STOP taking these medications      CINNAMON PO     clobetasol 0.05 % ointment  Commonly known as: TEMOVATE     Fish Oil Oil     guaiFENesin 600 MG extended release tablet  Commonly known as: Mucinex     MELOXICAM PO     zinc 50 MG Caps               Where to Get Your Medications        These medications were sent to [address]. Kendra Ledesma [phone] - F [phone] [address] [facility] [address]      Phone: [phone]   budesonide 3 MG extended release capsule  oxyCODONE-acetaminophen 5-325 MG per tablet         Activity: activity as tolerated  Diet: ADULT DIET; Regular      Disposition: home  Discharged Condition: Stable  Follow Up:   Rylee Morris MD  [address]  [phone]    Call  As needed    Joe Gomez  [address] [place] [address]  [phone]    Schedule an appointment as soon as possible for a visit      Aurelia Vasquez MD  [address] [facility]. [place] [address]  [phone]    Follow up in 4 week(s)  microscopic colitis        Code status:  Full Code         Total time spent on discharge, finalizing medications, referrals and arranging outpatient follow up was more than 45 minutes      Thank you Dr. Qi Mckay for the opportunity to be involved in this patients care.

## 2022-07-29 NOTE — PROGRESS NOTES
Patient home medications Methylphenidate picked up from inpatient pharmacy. Count completed. Medication bottle given to patient. Home medication Vortioxetine taken from medication bin and given to patient. Patient discharge orders completed. All questions answered. Patient states wants to complete lunch before leaving.

## 2022-07-29 NOTE — PLAN OF CARE
Problem: Discharge Planning  Goal: Discharge to home or other facility with appropriate resources  7/29/2022 1124 by Dontae Caldera RN  Outcome: Completed  7/29/2022 0041 by Sukhi Benítez RN  Outcome: Progressing     Problem: Pain  Goal: Verbalizes/displays adequate comfort level or baseline comfort level  7/29/2022 1124 by Dontae Caldera RN  Outcome: Completed  7/29/2022 0041 by Sukhi Benítez RN  Outcome: Progressing     Problem: ABCDS Injury Assessment  Goal: Absence of physical injury  7/29/2022 1124 by Dontae Caldera RN  Outcome: Completed  7/29/2022 0041 by Sukhi Benítez RN  Outcome: Progressing     Problem: Safety - Adult  Goal: Free from fall injury  7/29/2022 1124 by Dontae Caldera RN  Outcome: Completed  7/29/2022 0041 by Sukhi Benítez RN  Outcome: Progressing

## (undated) DEVICE — TRAP SPEC RETRV CLR PLAS POLYP IN LN SUCT QUIK CTCH

## (undated) DEVICE — SNARE COLD DIAMOND 10MM THIN

## (undated) DEVICE — FORCEPS BX L240CM JAW DIA2.4MM ORNG L CAP W/ NDL DISP RAD

## (undated) DEVICE — CANNULA SAMP CO2 AD GRN 7FT CO2 AND 7FT O2 TBNG UNIV CONN